# Patient Record
Sex: FEMALE | Race: WHITE | Employment: OTHER | URBAN - METROPOLITAN AREA
[De-identification: names, ages, dates, MRNs, and addresses within clinical notes are randomized per-mention and may not be internally consistent; named-entity substitution may affect disease eponyms.]

---

## 2019-09-06 ENCOUNTER — HOSPITAL ENCOUNTER (OUTPATIENT)
Dept: ULTRASOUND IMAGING | Age: 79
Discharge: NURSING, REHAB OR LONG-TERM CARE | End: 2019-09-06
Attending: FAMILY MEDICINE
Payer: MEDICARE

## 2019-09-06 DIAGNOSIS — R19.5 LOOSE STOOLS: ICD-10-CM

## 2019-09-06 PROCEDURE — 76700 US EXAM ABDOM COMPLETE: CPT

## 2020-02-21 ENCOUNTER — APPOINTMENT (OUTPATIENT)
Dept: GENERAL RADIOLOGY | Age: 80
DRG: 185 | End: 2020-02-21
Attending: EMERGENCY MEDICINE
Payer: MEDICARE

## 2020-02-21 ENCOUNTER — HOSPITAL ENCOUNTER (INPATIENT)
Age: 80
LOS: 3 days | Discharge: NURSING, REHAB OR LONG-TERM CARE | DRG: 185 | End: 2020-02-27
Attending: EMERGENCY MEDICINE | Admitting: INTERNAL MEDICINE
Payer: MEDICARE

## 2020-02-21 ENCOUNTER — APPOINTMENT (OUTPATIENT)
Dept: CT IMAGING | Age: 80
DRG: 185 | End: 2020-02-21
Attending: EMERGENCY MEDICINE
Payer: MEDICARE

## 2020-02-21 ENCOUNTER — APPOINTMENT (OUTPATIENT)
Dept: GENERAL RADIOLOGY | Age: 80
DRG: 185 | End: 2020-02-21
Attending: HOSPITALIST
Payer: MEDICARE
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
## 2020-02-21 DIAGNOSIS — S22.32XK: ICD-10-CM

## 2020-02-21 DIAGNOSIS — S22.39XA CLOSED FRACTURE OF ONE RIB, UNSPECIFIED LATERALITY, INITIAL ENCOUNTER: ICD-10-CM

## 2020-02-21 DIAGNOSIS — R07.9 CHEST PAIN, UNSPECIFIED TYPE: Primary | ICD-10-CM

## 2020-02-21 LAB
ALBUMIN SERPL-MCNC: 3.9 G/DL (ref 3.5–5)
ALBUMIN/GLOB SERPL: 1.1 {RATIO} (ref 1.1–2.2)
ALP SERPL-CCNC: 102 U/L (ref 45–117)
ALT SERPL-CCNC: 29 U/L (ref 12–78)
ANION GAP SERPL CALC-SCNC: 10 MMOL/L (ref 5–15)
AST SERPL-CCNC: 22 U/L (ref 15–37)
ATRIAL RATE: 78 BPM
BASOPHILS # BLD: 0 K/UL (ref 0–0.1)
BASOPHILS NFR BLD: 0 % (ref 0–1)
BILIRUB SERPL-MCNC: 0.5 MG/DL (ref 0.2–1)
BUN SERPL-MCNC: 32 MG/DL (ref 6–20)
BUN/CREAT SERPL: 27 (ref 12–20)
CALCIUM SERPL-MCNC: 9.1 MG/DL (ref 8.5–10.1)
CALCULATED P AXIS, ECG09: 61 DEGREES
CALCULATED R AXIS, ECG10: 50 DEGREES
CALCULATED T AXIS, ECG11: 29 DEGREES
CHLORIDE SERPL-SCNC: 101 MMOL/L (ref 97–108)
CO2 SERPL-SCNC: 31 MMOL/L (ref 21–32)
COMMENT, HOLDF: NORMAL
CREAT SERPL-MCNC: 1.19 MG/DL (ref 0.55–1.02)
DIAGNOSIS, 93000: NORMAL
DIFFERENTIAL METHOD BLD: ABNORMAL
EOSINOPHIL # BLD: 0.2 K/UL (ref 0–0.4)
EOSINOPHIL NFR BLD: 2 % (ref 0–7)
ERYTHROCYTE [DISTWIDTH] IN BLOOD BY AUTOMATED COUNT: 13.1 % (ref 11.5–14.5)
GLOBULIN SER CALC-MCNC: 3.4 G/DL (ref 2–4)
GLUCOSE BLD STRIP.AUTO-MCNC: 146 MG/DL (ref 65–100)
GLUCOSE BLD STRIP.AUTO-MCNC: 154 MG/DL (ref 65–100)
GLUCOSE BLD STRIP.AUTO-MCNC: 193 MG/DL (ref 65–100)
GLUCOSE BLD STRIP.AUTO-MCNC: 225 MG/DL (ref 65–100)
GLUCOSE SERPL-MCNC: 178 MG/DL (ref 65–100)
HCT VFR BLD AUTO: 42.7 % (ref 35–47)
HGB BLD-MCNC: 13.9 G/DL (ref 11.5–16)
IMM GRANULOCYTES # BLD AUTO: 0 K/UL (ref 0–0.04)
IMM GRANULOCYTES NFR BLD AUTO: 0 % (ref 0–0.5)
LYMPHOCYTES # BLD: 2.2 K/UL (ref 0.8–3.5)
LYMPHOCYTES NFR BLD: 20 % (ref 12–49)
MCH RBC QN AUTO: 29.8 PG (ref 26–34)
MCHC RBC AUTO-ENTMCNC: 32.6 G/DL (ref 30–36.5)
MCV RBC AUTO: 91.6 FL (ref 80–99)
MONOCYTES # BLD: 0.8 K/UL (ref 0–1)
MONOCYTES NFR BLD: 7 % (ref 5–13)
NEUTS SEG # BLD: 7.8 K/UL (ref 1.8–8)
NEUTS SEG NFR BLD: 71 % (ref 32–75)
NRBC # BLD: 0 K/UL (ref 0–0.01)
NRBC BLD-RTO: 0 PER 100 WBC
P-R INTERVAL, ECG05: 162 MS
PLATELET # BLD AUTO: 148 K/UL (ref 150–400)
PMV BLD AUTO: 12.1 FL (ref 8.9–12.9)
POTASSIUM SERPL-SCNC: 4.1 MMOL/L (ref 3.5–5.1)
PROT SERPL-MCNC: 7.3 G/DL (ref 6.4–8.2)
Q-T INTERVAL, ECG07: 382 MS
QRS DURATION, ECG06: 64 MS
QTC CALCULATION (BEZET), ECG08: 435 MS
RBC # BLD AUTO: 4.66 M/UL (ref 3.8–5.2)
SAMPLES BEING HELD,HOLD: NORMAL
SERVICE CMNT-IMP: ABNORMAL
SODIUM SERPL-SCNC: 142 MMOL/L (ref 136–145)
TROPONIN I SERPL-MCNC: <0.05 NG/ML
TROPONIN I SERPL-MCNC: <0.05 NG/ML
VENTRICULAR RATE, ECG03: 78 BPM
WBC # BLD AUTO: 11.1 K/UL (ref 3.6–11)

## 2020-02-21 PROCEDURE — 99218 HC RM OBSERVATION: CPT

## 2020-02-21 PROCEDURE — 82962 GLUCOSE BLOOD TEST: CPT

## 2020-02-21 PROCEDURE — 96375 TX/PRO/DX INJ NEW DRUG ADDON: CPT

## 2020-02-21 PROCEDURE — 80053 COMPREHEN METABOLIC PANEL: CPT

## 2020-02-21 PROCEDURE — 77030027138 HC INCENT SPIROMETER -A

## 2020-02-21 PROCEDURE — 36415 COLL VENOUS BLD VENIPUNCTURE: CPT

## 2020-02-21 PROCEDURE — 74011000250 HC RX REV CODE- 250: Performed by: HOSPITALIST

## 2020-02-21 PROCEDURE — 71045 X-RAY EXAM CHEST 1 VIEW: CPT

## 2020-02-21 PROCEDURE — 96374 THER/PROPH/DIAG INJ IV PUSH: CPT

## 2020-02-21 PROCEDURE — 96372 THER/PROPH/DIAG INJ SC/IM: CPT

## 2020-02-21 PROCEDURE — 74011250636 HC RX REV CODE- 250/636: Performed by: HOSPITALIST

## 2020-02-21 PROCEDURE — 85025 COMPLETE CBC W/AUTO DIFF WBC: CPT

## 2020-02-21 PROCEDURE — 97116 GAIT TRAINING THERAPY: CPT

## 2020-02-21 PROCEDURE — 72170 X-RAY EXAM OF PELVIS: CPT

## 2020-02-21 PROCEDURE — 74011250636 HC RX REV CODE- 250/636: Performed by: EMERGENCY MEDICINE

## 2020-02-21 PROCEDURE — 96376 TX/PRO/DX INJ SAME DRUG ADON: CPT

## 2020-02-21 PROCEDURE — 74011250637 HC RX REV CODE- 250/637: Performed by: HOSPITALIST

## 2020-02-21 PROCEDURE — 99282 EMERGENCY DEPT VISIT SF MDM: CPT

## 2020-02-21 PROCEDURE — 93005 ELECTROCARDIOGRAM TRACING: CPT

## 2020-02-21 PROCEDURE — 97530 THERAPEUTIC ACTIVITIES: CPT

## 2020-02-21 PROCEDURE — 71250 CT THORAX DX C-: CPT

## 2020-02-21 PROCEDURE — 84484 ASSAY OF TROPONIN QUANT: CPT

## 2020-02-21 PROCEDURE — 74011636637 HC RX REV CODE- 636/637: Performed by: HOSPITALIST

## 2020-02-21 PROCEDURE — 97161 PT EVAL LOW COMPLEX 20 MIN: CPT

## 2020-02-21 RX ORDER — INSULIN LISPRO 100 [IU]/ML
INJECTION, SOLUTION INTRAVENOUS; SUBCUTANEOUS
Status: DISCONTINUED | OUTPATIENT
Start: 2020-02-21 | End: 2020-02-27 | Stop reason: HOSPADM

## 2020-02-21 RX ORDER — HYDROMORPHONE HYDROCHLORIDE 1 MG/ML
0.5 INJECTION, SOLUTION INTRAMUSCULAR; INTRAVENOUS; SUBCUTANEOUS
Status: COMPLETED | OUTPATIENT
Start: 2020-02-21 | End: 2020-02-21

## 2020-02-21 RX ORDER — LANOLIN ALCOHOL/MO/W.PET/CERES
1-2 CREAM (GRAM) TOPICAL DAILY
COMMUNITY

## 2020-02-21 RX ORDER — GUAIFENESIN 100 MG/5ML
81 LIQUID (ML) ORAL DAILY
Status: DISCONTINUED | OUTPATIENT
Start: 2020-02-21 | End: 2020-02-27 | Stop reason: HOSPADM

## 2020-02-21 RX ORDER — IPRATROPIUM BROMIDE AND ALBUTEROL SULFATE 2.5; .5 MG/3ML; MG/3ML
3 SOLUTION RESPIRATORY (INHALATION)
Status: DISCONTINUED | OUTPATIENT
Start: 2020-02-21 | End: 2020-02-27 | Stop reason: HOSPADM

## 2020-02-21 RX ORDER — DILTIAZEM HYDROCHLORIDE 180 MG/1
180 CAPSULE, COATED, EXTENDED RELEASE ORAL DAILY
Status: DISCONTINUED | OUTPATIENT
Start: 2020-02-21 | End: 2020-02-21

## 2020-02-21 RX ORDER — ONDANSETRON 2 MG/ML
4 INJECTION INTRAMUSCULAR; INTRAVENOUS
Status: COMPLETED | OUTPATIENT
Start: 2020-02-21 | End: 2020-02-21

## 2020-02-21 RX ORDER — ENOXAPARIN SODIUM 100 MG/ML
1 INJECTION SUBCUTANEOUS
Status: COMPLETED | OUTPATIENT
Start: 2020-02-21 | End: 2020-02-21

## 2020-02-21 RX ORDER — HYDROCODONE BITARTRATE AND ACETAMINOPHEN 5; 325 MG/1; MG/1
1 TABLET ORAL
Status: DISCONTINUED | OUTPATIENT
Start: 2020-02-21 | End: 2020-02-25

## 2020-02-21 RX ORDER — SODIUM CHLORIDE 0.9 % (FLUSH) 0.9 %
10 SYRINGE (ML) INJECTION
Status: ACTIVE | OUTPATIENT
Start: 2020-02-21 | End: 2020-02-21

## 2020-02-21 RX ORDER — ACETAMINOPHEN 325 MG/1
650 TABLET ORAL
Status: DISCONTINUED | OUTPATIENT
Start: 2020-02-21 | End: 2020-02-27 | Stop reason: HOSPADM

## 2020-02-21 RX ORDER — MORPHINE SULFATE 2 MG/ML
1 INJECTION, SOLUTION INTRAMUSCULAR; INTRAVENOUS
Status: DISCONTINUED | OUTPATIENT
Start: 2020-02-21 | End: 2020-02-27 | Stop reason: HOSPADM

## 2020-02-21 RX ORDER — NITROGLYCERIN 0.4 MG/1
0.4 TABLET SUBLINGUAL
Status: DISCONTINUED | OUTPATIENT
Start: 2020-02-21 | End: 2020-02-27 | Stop reason: HOSPADM

## 2020-02-21 RX ORDER — SODIUM CHLORIDE 9 MG/ML
75 INJECTION, SOLUTION INTRAVENOUS CONTINUOUS
Status: DISPENSED | OUTPATIENT
Start: 2020-02-21 | End: 2020-02-22

## 2020-02-21 RX ORDER — LATANOPROST 50 UG/ML
1 SOLUTION/ DROPS OPHTHALMIC
Status: DISCONTINUED | OUTPATIENT
Start: 2020-02-21 | End: 2020-02-27 | Stop reason: HOSPADM

## 2020-02-21 RX ORDER — SODIUM CHLORIDE 9 MG/ML
50 INJECTION, SOLUTION INTRAVENOUS ONCE
Status: DISCONTINUED | OUTPATIENT
Start: 2020-02-21 | End: 2020-02-21

## 2020-02-21 RX ORDER — MAGNESIUM SULFATE 100 %
4 CRYSTALS MISCELLANEOUS AS NEEDED
Status: DISCONTINUED | OUTPATIENT
Start: 2020-02-21 | End: 2020-02-27 | Stop reason: HOSPADM

## 2020-02-21 RX ORDER — SODIUM CHLORIDE 0.9 % (FLUSH) 0.9 %
5-40 SYRINGE (ML) INJECTION AS NEEDED
Status: DISCONTINUED | OUTPATIENT
Start: 2020-02-21 | End: 2020-02-27 | Stop reason: HOSPADM

## 2020-02-21 RX ORDER — ATORVASTATIN CALCIUM 20 MG/1
20 TABLET, FILM COATED ORAL
Status: DISCONTINUED | OUTPATIENT
Start: 2020-02-21 | End: 2020-02-27 | Stop reason: HOSPADM

## 2020-02-21 RX ORDER — DEXTROSE MONOHYDRATE 100 MG/ML
0-250 INJECTION, SOLUTION INTRAVENOUS AS NEEDED
Status: DISCONTINUED | OUTPATIENT
Start: 2020-02-21 | End: 2020-02-27 | Stop reason: HOSPADM

## 2020-02-21 RX ORDER — LIDOCAINE 4 G/100G
1 PATCH TOPICAL EVERY 24 HOURS
Status: DISCONTINUED | OUTPATIENT
Start: 2020-02-21 | End: 2020-02-25

## 2020-02-21 RX ORDER — LOSARTAN POTASSIUM 100 MG/1
100 TABLET ORAL DAILY
COMMUNITY

## 2020-02-21 RX ORDER — ENOXAPARIN SODIUM 100 MG/ML
1 INJECTION SUBCUTANEOUS EVERY 24 HOURS
Status: DISCONTINUED | OUTPATIENT
Start: 2020-02-21 | End: 2020-02-21

## 2020-02-21 RX ORDER — GLIPIZIDE 5 MG/1
5 TABLET ORAL DAILY
COMMUNITY

## 2020-02-21 RX ORDER — SODIUM CHLORIDE 0.9 % (FLUSH) 0.9 %
5-40 SYRINGE (ML) INJECTION EVERY 8 HOURS
Status: DISCONTINUED | OUTPATIENT
Start: 2020-02-21 | End: 2020-02-27 | Stop reason: HOSPADM

## 2020-02-21 RX ORDER — MELATONIN
1000 DAILY
COMMUNITY

## 2020-02-21 RX ADMIN — INSULIN LISPRO 3 UNITS: 100 INJECTION, SOLUTION INTRAVENOUS; SUBCUTANEOUS at 09:10

## 2020-02-21 RX ADMIN — ASPIRIN 81 MG 81 MG: 81 TABLET ORAL at 09:10

## 2020-02-21 RX ADMIN — ATORVASTATIN CALCIUM 20 MG: 20 TABLET, FILM COATED ORAL at 22:36

## 2020-02-21 RX ADMIN — HYDROMORPHONE HYDROCHLORIDE 0.5 MG: 1 INJECTION, SOLUTION INTRAMUSCULAR; INTRAVENOUS; SUBCUTANEOUS at 04:25

## 2020-02-21 RX ADMIN — Medication 10 ML: at 22:32

## 2020-02-21 RX ADMIN — INSULIN LISPRO 2 UNITS: 100 INJECTION, SOLUTION INTRAVENOUS; SUBCUTANEOUS at 16:30

## 2020-02-21 RX ADMIN — HYDROMORPHONE HYDROCHLORIDE 0.5 MG: 1 INJECTION, SOLUTION INTRAMUSCULAR; INTRAVENOUS; SUBCUTANEOUS at 05:59

## 2020-02-21 RX ADMIN — SODIUM CHLORIDE 75 ML/HR: 900 INJECTION, SOLUTION INTRAVENOUS at 09:09

## 2020-02-21 RX ADMIN — SODIUM CHLORIDE 75 ML/HR: 900 INJECTION, SOLUTION INTRAVENOUS at 22:48

## 2020-02-21 RX ADMIN — ENOXAPARIN SODIUM 90 MG: 100 INJECTION SUBCUTANEOUS at 05:06

## 2020-02-21 RX ADMIN — HYDROMORPHONE HYDROCHLORIDE 0.5 MG: 1 INJECTION, SOLUTION INTRAMUSCULAR; INTRAVENOUS; SUBCUTANEOUS at 02:58

## 2020-02-21 RX ADMIN — Medication 10 ML: at 14:00

## 2020-02-21 RX ADMIN — MORPHINE SULFATE 1 MG: 2 INJECTION, SOLUTION INTRAMUSCULAR; INTRAVENOUS at 18:40

## 2020-02-21 RX ADMIN — INSULIN LISPRO 2 UNITS: 100 INJECTION, SOLUTION INTRAVENOUS; SUBCUTANEOUS at 12:21

## 2020-02-21 RX ADMIN — SODIUM CHLORIDE 1000 ML: 900 INJECTION, SOLUTION INTRAVENOUS at 03:42

## 2020-02-21 RX ADMIN — LATANOPROST 1 DROP: 50 SOLUTION OPHTHALMIC at 22:36

## 2020-02-21 RX ADMIN — HYDROCODONE BITARTRATE AND ACETAMINOPHEN 1 TABLET: 5; 325 TABLET ORAL at 20:37

## 2020-02-21 RX ADMIN — ONDANSETRON 4 MG: 2 INJECTION, SOLUTION INTRAMUSCULAR; INTRAVENOUS at 02:59

## 2020-02-21 NOTE — ED NOTES
TRANSFER - OUT REPORT:    Verbal report given to Reg Nesbitt RN on Ethan Guyr  being transferred to [facility] Emergency Department (unit) for routine progression of care       Report consisted of patients Situation, Background, Assessment and   Recommendations(SBAR). Information from the following report(s) ED Summary was reviewed with the receiving nurse. Lines:   Peripheral IV 02/21/20 Right Antecubital (Active)   Site Assessment Clean, dry, & intact 2/21/2020  2:54 AM   Phlebitis Assessment 0 2/21/2020  2:54 AM   Infiltration Assessment 0 2/21/2020  2:54 AM   Dressing Status Clean, dry, & intact 2/21/2020  2:54 AM   Dressing Type Transparent 2/21/2020  2:54 AM   Hub Color/Line Status Pink;Patent; Flushed 2/21/2020  2:54 AM   Action Taken Blood drawn 2/21/2020  2:54 AM   Alcohol Cap Used Yes 2/21/2020  2:54 AM        Opportunity for questions and clarification was provided.       Patient transported with:   Monitor by [employer]

## 2020-02-21 NOTE — PROGRESS NOTES
Orders received, chart reviewed and patient evaluated by physical therapy. Pending progression with skilled acute physical therapy, recommend:  Therapy up to 5 days/week in an inpatient setting or home health therapy program and caregiver assistance for bed transfers and upright mobility. BP assessed sup/sit/stand, negative for orthostatic hypotension though patient did voice feeling light headed when up. O2 removed for therapy activity. SpO2 remained in mid to upper 90's while on room air. Recommend with nursing patient to complete as able in order to maintain strength, endurance and independence: OOB to chair 3x/day for meals with assist x1 and ambulating with rolling walker and assist x1. Thank you for your assistance. Full evaluation to follow.

## 2020-02-21 NOTE — ED TRIAGE NOTES
Pt claims she fell down 3 steps injuring L. Side ribs and flank pain. Pain increases with deep breath and movement. No obvious bruising. Lung sounds = bilaterally.

## 2020-02-21 NOTE — H&P
HISTORY AND PHYSICAL  Odessa Whaley MD        PCP: Leandro Olivares MD    Please note that this dictation was completed with [employer], the computer voice recognition software. Quite often unanticipated grammatical, syntax, homophones, and other interpretive errors are inadvertently transcribed by the computer software. Please disregard these errors. Please excuse any errors that have escaped final proofreading. CHIEF COMPLAINTS    Left-sided chest pain following a fall    HISTORY OF PRESENT ILLNESS   This is a 43-year-old female with a past medical history of coronary artery disease and had stents put in first in 1995 and recently 2014, hypertension, COPD, type 2 diabetes, GERD, renal cell carcinoma status post nephrectomy December 2019 presented to the [facility] emergency room for a left-sided chest pain. Patient fell 3 steps when she missed a step. Experiencing pain since then worse with deep breathing. Her work-up thus far showed negative cardiac enzymes, CAT scan of the chest showed a nondisplaced left sixth anterior rib fracture. In the ED she received 3 doses of IV Dilaudid. In the ED, there was some thought about PE and she received a therapeutic dose of Lovenox empirically. No imaging study was performed.   However, patient came with the left-sided chest pain after a mechanical fall, not a syncopal event, with reproducible tenderness and CAT scan is showing rib fracture, besides patient is not tachycardic nor hypoxic, as such no clinical grounds to entertain PE.      PMH/PSH:  Past Medical History:   Diagnosis Date    CAD (coronary artery disease)     COPD     Diabetes ([address]     Gastrointestinal disorder     GERD (gastroesophageal reflux disease)     Hypertension     MI (myocardial infarction) ([address]     Dr. Donte Pate Osteoporosis      Past Surgical History:   Procedure Laterality Date    CARDIAC SURG PROCEDURE UNLIST      stents x 3    HX HEENT      tonsillectomy    HX NEPHRECTOMY Right     partial dec2019 for ca    HX ORTHOPAEDIC      bilateral hips replaced    HX ORTHOPAEDIC      L wrist titanium gentry placed    HX ORTHOPAEDIC      carpal tunnel bilaterally    HX ORTHOPAEDIC      trigger finger release on bilateral thumbs and ring and middle finger on L hand       Home meds:   Prior to Admission medications    Medication Sig Start Date End Date Taking? Authorizing Provider   glipiZIDE (GLUCOTROL) 5 mg tablet Take 5 mg by mouth daily. Yes Provider, Historical   glucosamine-chondroitin (ARTHX) 500-400 mg cap Take 1-2 Caps by mouth daily. Yes Provider, Historical   losartan (COZAAR) 100 mg tablet Take 100 mg by mouth daily. Yes Provider, Historical   SITagliptin (JANUVIA) 50 mg tablet Take 50 mg by mouth daily. Yes Provider, Historical   cholecalciferol (VITAMIN D3) (1000 Units /25 mcg) tablet Take 1,000 Units by mouth daily. Yes Provider, Historical   vit C,Q-Ya-maydi-lutein-zeaxan (PRESERVISION AREDS-2) 151-508-91-1 mg-unit-mg-mg cap capsule Take 1 Cap by mouth two (2) times daily (with meals). Yes Provider, Historical   aspirin delayed-release 81 mg tablet Take 1 Tab by mouth daily. 4/18/14  Yes Dharmesh Porter MD   tiotropium (SPIRIVA WITH HANDIHALER) 18 mcg inhalation capsule Take 1 Cap by inhalation daily. Yes Other, MD [place]   therapeutic multivitamin (THERAGRAN) tablet Take 1 Tab by mouth daily. Yes Provider, Historical   valsartan-hydroCHLOROthiazide (DIOVAN HCT) 320-25 mg per tablet Take 1 Tab by mouth daily. Yes Other, MD [place]   atorvastatin (LIPITOR) 10 mg tablet Take 10 mg by mouth nightly. Yes Danya, MD [place]   latanoprost (XALATAN) 0.005 % ophthalmic solution Administer 1 Drop to both eyes nightly. Yes Other, MD [place]   nitroglycerin (NITROSTAT) 0.4 mg SL tablet 1 Tab by SubLINGual route every five (5) minutes as needed for Chest Pain. 4/18/14   Dharmesh Porter MD       Allergies:   Allergies   Allergen Reactions    Atenolol Hives    Codeine Nausea and Vomiting    Macrobid [Nitrofurantoin Monohyd/M-Cryst] Rash       FH:  History reviewed. No pertinent family history. SH:  Social History     Tobacco Use    Smoking status: Former Smoker     Last attempt to quit: 1994     Years since quittin.8    Smokeless tobacco: Never Used   Substance Use Topics    Alcohol use: Yes     Comment: rarely       ROS: A comprehensive review of systems was negative except for that written in the HPI. PHYSICAL EXAM:  Visit Vitals  /61   Pulse 67   Temp 98.2 °F (36.8 °C)   Resp 28   Ht 5' 5\" (1.651 m)   Wt 88.5 kg (195 lb)   SpO2 96%   BMI 32.45 kg/m²       General:           Alert oriented x4. Some discomfort on deep breathing. Sumaya Goins HEENT:           Atraumatic, anicteric sclerae, pink conjunctivae                          No oral ulcers, mucosa moist, throat clear, dentition fair  Neck:               Supple, symmetrical,  thyroid: non tender  Lungs:              Not in distress. On room air. Tenderness on the left anterolateral mid chest.  Symmetrical air entry. No no wheezing  Chest wall:      No tenderness  No Accessory muscle use. Heart:              Regular  rhythm,  No  murmur   No edema  Abdomen:        Soft, non-tender. Not distended. Bowel sounds normal  Extremities:      Superficial laceration on the right knee. Moves all extremities fine, left hip range of motion limited which he says is chronic from her DJD  Skin:                Not pale. Not Jaundiced  No rashes   Psych:             Not anxious or agitated. Neurologic:     Alert and oriented to PPT, CNII-XII intact. Motor and sensory exam grossly intact.   Labs/Imaging:  Recent Results (from the past 24 hour(s))   EKG, 12 LEAD, INITIAL    Collection Time: 20  2:42 AM   Result Value Ref Range    Ventricular Rate 78 BPM    Atrial Rate 78 BPM    P-R Interval 162 ms    QRS Duration 64 ms    Q-T Interval 382 ms    QTC Calculation (Bezet) 435 ms    Calculated P Axis 61 degrees    Calculated R Axis 50 degrees    Calculated T Axis 29 degrees    Diagnosis       Normal sinus rhythm  Cannot rule out Anterior infarct , age undetermined  Abnormal ECG  When compared with ECG of 18-APR-2014 08:09,  ST elevation now present in Inferior leads  ST now depressed in Anterior leads  T wave amplitude has decreased in Anterior leads     CBC WITH AUTOMATED DIFF    Collection Time: 02/21/20  2:55 AM   Result Value Ref Range    WBC 11.1 (H) 3.6 - 11.0 K/uL    RBC 4.66 3.80 - 5.20 M/uL    HGB 13.9 11.5 - 16.0 g/dL    HCT 42.7 35.0 - 47.0 %    MCV 91.6 80.0 - 99.0 FL    MCH 29.8 26.0 - 34.0 PG    MCHC 32.6 30.0 - 36.5 g/dL    RDW 13.1 11.5 - 14.5 %    PLATELET 936 (L) 897 - 400 K/uL    MPV 12.1 8.9 - 12.9 FL    NRBC 0.0 0  WBC    ABSOLUTE NRBC 0.00 0.00 - 0.01 K/uL    NEUTROPHILS 71 32 - 75 %    LYMPHOCYTES 20 12 - 49 %    MONOCYTES 7 5 - 13 %    EOSINOPHILS 2 0 - 7 %    BASOPHILS 0 0 - 1 %    IMMATURE GRANULOCYTES 0 0.0 - 0.5 %    ABS. NEUTROPHILS 7.8 1.8 - 8.0 K/UL    ABS. LYMPHOCYTES 2.2 0.8 - 3.5 K/UL    ABS. MONOCYTES 0.8 0.0 - 1.0 K/UL    ABS. EOSINOPHILS 0.2 0.0 - 0.4 K/UL    ABS. BASOPHILS 0.0 0.0 - 0.1 K/UL    ABS. IMM. GRANS. 0.0 0.00 - 0.04 K/UL    DF AUTOMATED     METABOLIC PANEL, COMPREHENSIVE    Collection Time: 02/21/20  2:55 AM   Result Value Ref Range    Sodium 142 136 - 145 mmol/L    Potassium 4.1 3.5 - 5.1 mmol/L    Chloride 101 97 - 108 mmol/L    CO2 31 21 - 32 mmol/L    Anion gap 10 5 - 15 mmol/L    Glucose 178 (H) 65 - 100 mg/dL    BUN 32 (H) 6 - 20 MG/DL    Creatinine 1.19 (H) 0.55 - 1.02 MG/DL    BUN/Creatinine ratio 27 (H) 12 - 20      GFR est AA 53 (L) >60 ml/min/1.73m2    GFR est non-AA 44 (L) >60 ml/min/1.73m2    Calcium 9.1 8.5 - 10.1 MG/DL    Bilirubin, total 0.5 0.2 - 1.0 MG/DL    ALT (SGPT) 29 12 - 78 U/L    AST (SGOT) 22 15 - 37 U/L    Alk.  phosphatase 102 45 - 117 U/L    Protein, total 7.3 6.4 - 8.2 g/dL    Albumin 3.9 3.5 - 5.0 g/dL    Globulin 3.4 2.0 - 4.0 g/dL    A-G Ratio 1.1 1.1 - 2.2     TROPONIN I    Collection Time: 02/21/20  2:55 AM   Result Value Ref Range    Troponin-I, Qt. <0.05 <0.05 ng/mL   SAMPLES BEING HELD    Collection Time: 02/21/20  2:55 AM   Result Value Ref Range    SAMPLES BEING HELD BLUE,GREEN,LAV     COMMENT        Add-on orders for these samples will be processed based on acceptable specimen integrity and analyte stability, which may vary by analyte. GLUCOSE, POC    Collection Time: 02/21/20  8:43 AM   Result Value Ref Range    Glucose (POC) 225 (H) 65 - 100 mg/dL    Performed by [facility] [address], POC    Collection Time: 02/21/20 12:05 PM   Result Value Ref Range    Glucose (POC) 193 (H) 65 - 100 mg/dL    Performed by [facility] GEREMIASRUBINA RON        Recent Labs     02/21/20  0255   WBC 11.1*   HGB 13.9   HCT 42.7   *     Recent Labs     02/21/20  0255      K 4.1      CO2 31   BUN 32*   CREA 1.19*   *   CA 9.1     Recent Labs     02/21/20  0255   SGOT 22   ALT 29      TBILI 0.5   TP 7.3   ALB 3.9   GLOB 3.4       Recent Labs     02/21/20  0255   TROIQ <0.05       No results for input(s): INR, PTP, APTT, INREXT in the last 72 hours. No results for input(s): PH, PCO2, PO2 in the last 72 hours. XR PELV AP ONLY  Narrative: EXAM:  XR PELV AP ONLY    INDICATION:   She fell last night and broke left 6th rib,she now has worse than  baseline left hip pain    COMPARISON: None. FINDINGS: An AP view of the pelvis demonstrates bilateral total hip  replacements. The entire femoral gentry are not included on the study. No pelvic  fracture is identified allowing for technique. Impression: IMPRESSION: Patient is status post bilateral total hip arthroplasties. No pelvic  fracture identified. The entire femoral rods are not included on this study. CT CHEST WO CONT  Narrative: INDICATION: Chest pain    COMPARISON: March 23, 2007    CONTRAST: None.     TECHNIQUE:  5 mm axial images were obtained through the chest. Coronal and  sagittal reconstructions were generated. CT dose reduction was achieved through  use of a standardized protocol tailored for this examination and automatic  exposure control for dose modulation. The absence of intravenous contrast reduces the sensitivity for evaluation of  the mediastinum and upper abdominal organs. FINDINGS:  CHEST WALL: 1.9 x 1.2 cm left axillary lymph node. THYROID: No nodule. MEDIASTINUM: No mass or lymphadenopathy. ANDERSON: No mass or lymphadenopathy. THORACIC AORTA: No aneurysm. MAIN PULMONARY ARTERY: Normal in caliber. TRACHEA/BRONCHI: Patent. ESOPHAGUS: No wall thickening or dilatation. HEART: Normal in size. Extensive coronary artery calcifications. PLEURA: No effusion or pneumothorax. LUNGS: Unchanged 9 mm left lower lobe pulmonary nodule. Unchanged 4 mm right  middle lobe pulmonary nodule. Bilateral dependent atelectasis. INCIDENTALLY IMAGED UPPER ABDOMEN: 11 mm cyst in the left hepatic lobe. BONES: Nondisplaced left sixth anterior rib fracture. Impression: IMPRESSION:  Nondisplaced left sixth anterior rib fracture. Left axillary lymphadenopathy. Unchanged pulmonary nodules. XR CHEST PORT  Narrative: INDICATION: Chest pain    COMPARISON: April 17, 2014    FINDINGS: AP portable imaging of the chest performed at 2:41 AM demonstrates a  stable cardiomediastinal silhouette. The lungs are clear bilaterally. No  significant osseous abnormalities are seen. Impression: IMPRESSION: No evidence of acute cardiopulmonary process. Assessment & Plan: This is a 70-year-old female who came from home with left-sided chest pain after a fall. She missed a step and she fell down 3 steps. She landed on the left side, denied hitting her head. No presyncope or syncope. She is tender on the left anterior chest, CT scan showed 6 rib fracture nondisplaced. #Left anterior sixth rib fracture, nondisplaced.   Pain exacerbated by deep breathing with reproducible tenderness  -MI/PE unlikely in the current clinical context  -Admit patient for pain control  -Incentive spirometry  -Pelvic x-rays negative for acute fracture dislocation  -PT and OT evaluation  #Mild dehydration: Continue IV fluid x24 hours. Hold losartan and diuretics. #Diabetes: Check blood sugar pre-meal and at night, cover with Humalog sliding scale.  -Can resume oral hypoglycemics upon discharge  # RCC status post recent partial resection of the right kidney  #CAD status post stent in 1995 and 2014: Continue antiplatelets  #DJD with bilateral hip replacement, the left hip is bothering her.   Pelvic x-rays negative  #COPD without bronchospasm: Continue bronchodilators  #Hypertension: Fairly controlled         Patient's Baseline: Ambulates with walking  DVT ppx: Lovenox  Code status: Full code  Disposition: Anticipate home with home health physical therapy services                Signed By: Danya Pugh MD     February 21, 2020

## 2020-02-21 NOTE — ED NOTES
[facility] en route to [facility] with patient. [facility] ED updated with ETA.

## 2020-02-21 NOTE — ROUTINE PROCESS
TRANSFER - OUT REPORT: 
 
Verbal report given to AMRITA Chahal(name) on [address] [place] [address]  being transferred to (unit) for routine progression of care Report consisted of patients Situation, Background, Assessment and  
Recommendations(SBAR). Information from the following report(s) SBAR, ED Summary, [facility] and Recent Results was reviewed with the receiving nurse. Lines:  
Peripheral IV 02/21/20 Right Antecubital (Active) Site Assessment Clean, dry, & intact 2/21/2020  2:54 AM  
Phlebitis Assessment 0 2/21/2020  2:54 AM  
Infiltration Assessment 0 2/21/2020  2:54 AM  
Dressing Status Clean, dry, & intact 2/21/2020  2:54 AM  
Dressing Type Transparent 2/21/2020  2:54 AM  
Hub Color/Line Status Pink;Patent; Flushed 2/21/2020  2:54 AM  
Action Taken Blood drawn 2/21/2020  2:54 AM  
Alcohol Cap Used Yes 2/21/2020  2:54 AM  
  
 
Opportunity for questions and clarification was provided. Patient transported with: 
Transportation

## 2020-02-21 NOTE — PROGRESS NOTES
Admission Medication Reconciliation:    Information obtained from:  Patient, pharmacy  RxQuery data available¹:  NO    Comments/Recommendations: Updated PTA meds/reviewed patient's allergies. 1)  Reviewed medication list provided by patient's . Patient reports that the medication list is up to date except for Januvia. [address] for [employer] dosing information, which is 50mg daily. Patient reports the she took her medications yesterday morning. 2)  Medication changes (since last review): Added  - Sitagliptin 50mg 1tab po daily  - Losartan 100mg 1tab po daily  - Preservision AREDS2 1cap po daily  - Glucosamine 1-2caps po daily  - Glipizide 5mg 1tab po daily    Adjusted  - Atorvastatin 20mg 1tab po nightly --> 10mg 1tab po nightly    Removed  - Calcium carbonate 600mg po daily  - Diltiazem CD 180mg po daily  - Metformin 1000mg po daily  - Metformin 500mg po daily  - Ticagrelor 90mg 1tab po every 12 hours  - Valsartan-hydrochlorothiazide 160-25mg 1tab po daily     ¹RxQuery pharmacy benefit data reflects medications filled and processed through the patient's insurance, however   this data does NOT capture whether the medication was picked up or is currently being taken by the patient. Allergies:  Atenolol; Codeine; and Macrobid [nitrofurantoin monohyd/m-cryst]    Significant PMH/Disease States:   Past Medical History:   Diagnosis Date    CAD (coronary artery disease)     COPD     [address] [address]     Gastrointestinal disorder     GERD (gastroesophageal reflux disease)     Hypertension     MI (myocardial infarction) ([facility] [address]     Dr. Virginia Díaz for this Admission:    Chief Complaint   Patient presents with    Rib Injury    Chest Pain     Prior to Admission Medications:   Prior to Admission Medications   Prescriptions Last Dose Informant Taking? SITagliptin (JANUVIA) 50 mg tablet 2/20/2020 at Unknown time  Yes   Sig: Take 50 mg by mouth daily.    aspirin delayed-release 81 mg tablet 2020 at Unknown time  Yes   Sig: Take 1 Tab by mouth daily. atorvastatin (LIPITOR) 10 mg tablet   Yes   Sig: Take 10 mg by mouth nightly. cholecalciferol (VITAMIN D3) (1000 Units /25 mcg) tablet 2020 at Unknown time  Yes   Sig: Take 1,000 Units by mouth daily. glipiZIDE (GLUCOTROL) 5 mg tablet 2020 at Unknown time  Yes   Sig: Take 5 mg by mouth daily. glucosamine-chondroitin (ARTHX) 500-400 mg cap 2020 at Unknown time  Yes   Sig: Take 1-2 Caps by mouth daily. latanoprost (XALATAN) 0.005 % ophthalmic solution   Yes   Sig: Administer 1 Drop to both eyes nightly. losartan (COZAAR) 100 mg tablet 2020 at Unknown time  Yes   Sig: Take 100 mg by mouth daily. nitroglycerin (NITROSTAT) 0.4 mg SL tablet   No   Si Tab by SubLINGual route every five (5) minutes as needed for Chest Pain. therapeutic multivitamin (THERAGRAN) tablet 2020 at Unknown time  Yes   Sig: Take 1 Tab by mouth daily. tiotropium (SPIRIVA WITH HANDIHALER) 18 mcg inhalation capsule 2020 at Unknown time  Yes   Sig: Take 1 Cap by inhalation daily. valsartan-hydroCHLOROthiazide (DIOVAN HCT) 320-25 mg per tablet 2020 at Unknown time  Yes   Sig: Take 1 Tab by mouth daily. Facility-Administered Medications: None       Please contact the main inpatient pharmacy with any questions or concerns at [phone] and we will direct you to the clinical pharmacist covering this patient's care while in-house.    Melania Dee, PHARMD

## 2020-02-21 NOTE — ED NOTES
Patient eating lunch tray with family at bedside. No acute signs of distress. RN will continue to monitor.

## 2020-02-21 NOTE — ED TRIAGE NOTES
Pt arrives via EMS as a transfer from [facility] for a cardiac w/u. Initially arrived to [facility] ED as a ground level fall. A&O x 4. Received dilaudid prior to leaving  ED.      Hx of MI and Stents

## 2020-02-21 NOTE — ED PROVIDER NOTES
The patient presents to the ED with left chest pain after a fall. She fell at 7:30 PM. She fell on to the floor. She denies hitting her head or any LOC. She denies significant pain at the time of the fall. She did hit her left knee. She is unsure if she landed on her chest. She did not have pain when she fell. She woke up this AM with pain. She reports severe pain to her left chest. Pain is increased with deep breath, but she denies any shortness of breath. She denies any nausea or diaphoresis. Pain is severe, 10/10 and sharp. No meds have been taken for pain. She denies any other injuries or concerns. Of note, she had 25% of R kidney removed in December for kidney cancer. CARDS: Dr. Tatiana Gates - she reports having 4 stents. She takes 81 mg aspirin daily. The history is provided by the patient. Rib Injury   Associated symptoms include chest pain. Pertinent negatives include no fever, no headaches, no sore throat, no cough, no vomiting, no abdominal pain and no rash. Past Medical History:   Diagnosis Date    CAD (coronary artery disease)     COPD     [facility]     Gastrointestinal disorder     GERD (gastroesophageal reflux disease)     Hypertension     MI (myocardial infarction) ([facility]     Dr. Carlisle Payment Osteoporosis        Past Surgical History:   Procedure Laterality Date    CARDIAC SURG PROCEDURE UNLIST      stents x 3    HX HEENT      tonsillectomy    HX ORTHOPAEDIC      bilateral hips replaced    HX ORTHOPAEDIC      L wrist titanium gentry placed    HX ORTHOPAEDIC      carpal tunnel bilaterally    HX ORTHOPAEDIC      trigger finger release on bilateral thumbs and ring and middle finger on L hand         History reviewed. No pertinent family history.     Social History     Socioeconomic History    Marital status:      Spouse name: Not on file    Number of children: Not on file    Years of education: Not on file    Highest education level: Not on file   Occupational History    Not on file   Social Needs    Financial resource strain: Not on file    Food insecurity:     Worry: Not on file     Inability: Not on file    Transportation needs:     Medical: Not on file     Non-medical: Not on file   Tobacco Use    Smoking status: Former Smoker     Last attempt to quit: 1994     Years since quittin.8    Smokeless tobacco: Never Used   Substance and Sexual Activity    Alcohol use: Yes     Comment: rarely    Drug use: No    Sexual activity: Not on file   Lifestyle    Physical activity:     Days per week: Not on file     Minutes per session: Not on file    Stress: Not on file   Relationships    Social connections:     Talks on phone: Not on file     Gets together: Not on file     Attends [employer] service: Not on file     Active member of club or organization: Not on file     Attends meetings of clubs or organizations: Not on file     Relationship status: Not on file    Intimate partner violence:     Fear of current or ex partner: Not on file     Emotionally abused: Not on file     Physically abused: Not on file     Forced sexual activity: Not on file   Other Topics Concern    Not on file   Social History Narrative    Not on file         ALLERGIES: Atenolol and Codeine    Review of Systems   Constitutional: Negative for appetite change and fever. HENT: Negative for congestion, nosebleeds and sore throat. Eyes: Negative for discharge and visual disturbance. Respiratory: Negative for cough and shortness of breath. Cardiovascular: Positive for chest pain. Gastrointestinal: Negative for abdominal pain, diarrhea, nausea and vomiting. Genitourinary: Negative for dysuria. Musculoskeletal: Negative. Skin: Positive for wound. Negative for rash. Neurological: Negative for weakness and headaches. Hematological: Negative for adenopathy. Psychiatric/Behavioral: Negative. All other systems reviewed and are negative.       Vitals:    20 0244 BP: 148/66   Pulse: 82   Resp: 20   Temp: 97.8 °F (36.6 °C)   SpO2: 94%   Weight: 88.5 kg (195 lb 1.7 oz)   Height: 5' 5\" (1.651 m)            Physical Exam  Vitals signs and nursing note reviewed. Constitutional:       General: She is in acute distress. Appearance: Normal appearance. She is well-developed. She is obese. HENT:      Head: Normocephalic and atraumatic. Nose: Nose normal.      Mouth/Throat:      Mouth: Mucous membranes are moist.   Eyes:      Extraocular Movements: Extraocular movements intact. Conjunctiva/sclera: Conjunctivae normal.      Pupils: Pupils are equal, round, and reactive to light. Neck:      Musculoskeletal: Normal range of motion and neck supple. Cardiovascular:      Rate and Rhythm: Normal rate and regular rhythm. Pulses: Normal pulses. Heart sounds: Normal heart sounds. Pulmonary:      Effort: Pulmonary effort is normal.      Breath sounds: Rales (at the bases) present. Chest:      Chest wall: Tenderness (anterior chest and with compression. ) present. Abdominal:      General: Abdomen is flat. Bowel sounds are normal.      Palpations: Abdomen is soft. Musculoskeletal: Normal range of motion. Comments: Mild tenderness and abrasion L knee. Skin:     General: Skin is warm and dry. Capillary Refill: Capillary refill takes less than 2 seconds. Neurological:      General: No focal deficit present. Mental Status: She is alert and oriented to person, place, and time. Psychiatric:         Mood and Affect: Mood normal.         Behavior: Behavior normal.          MDM       Procedures    ED EKG interpretation:  Rhythm: normal sinus rhythm; and regular . Rate (approx.): 78; Axis: normal; P wave: normal; QRS interval: normal ; ST/T wave: non-specific changes; interpreted by Charmaine Swanson MD, ED MD.    A/P:  1. Chest pain - ? MSK from trauma vs cardiac. Plan labs. Consider chest CT.    3:21 AM  Change of shift.   Care of patient signed over to Dr. Camila Chavira  Bedside handoff complete.  Awaiting labs and re-eval.

## 2020-02-21 NOTE — PROGRESS NOTES
Problem: Mobility Impaired (Adult and Pediatric)  Goal: *Acute Goals and Plan of Care (Insert Text)  Description  FUNCTIONAL STATUS PRIOR TO ADMISSION: Patient was modified independent using a single point cane for functional mobility. HOME SUPPORT PRIOR TO ADMISSION: The patient lived with spouse but did not require assist.    Physical Therapy Goals  Initiated 2/21/2020  1. Patient will move from supine to sit and sit to supine , scoot up and down and roll side to side in bed with modified independence within 7 day(s). 2.  Patient will transfer from bed to chair and chair to bed with modified independence using the least restrictive device within 7 day(s). 3.  Patient will perform sit to stand with modified independence within 7 day(s). 4.  Patient will ambulate with modified independence for 150 feet with the least restrictive device within 7 day(s). 5.  Patient will ascend/descend 12 stairs with handrail(s) with modified independence within 7 day(s). Outcome: Not Met     PHYSICAL THERAPY EVALUATION  Patient: Avery Garcia (87 y.o. female)  Date: 2/21/2020  Primary Diagnosis: Chest pain [R07.9]  Chest pain [R07.9]  Chest pain [R07.9]  Rib fracture [S22.39XA]        Precautions:  Fall      ASSESSMENT  Based on the objective data described below, the patient presents with increased pain with activity and c/o light headedness s/p a fall down 2 steps in her home. X-ray shows no pelvic fracture. Chest CT shows nondisplaced 6th rib. BP assessed sup/sit/stand during this visit  negative for orthostatic hypotension though patient voiced increased light headedness when upright. Patient had the most difficulty transitioning positions in bed and sit<->stand. She voiced concern re: how her spouse would assist her with this at home. She did fairly well ambulating with a rolling walker though limited time up due to pain and unable to attempt steps today.   Patient is functioning below her baseline of independent with transfers and ambulation using a cane. Plan for next visit: Gait training with cane to allow patient to mobilize throughout the inside of her home and stair training. Current Level of Function Impacting Discharge (mobility/balance): Minimum assist for bed mobility; CGA for ambulation with RW    Functional Outcome Measure: The patient scored 70/100 on the Barthel outcome measure which is indicative of 30% impaired ability to care for basic self needs/dependency on others. Other factors to consider for discharge: Lives with spouse who has his own medical issues. Lives in tri level home - will need to clear steps and be able to ambulate with a cane. Patient will benefit from skilled therapy intervention to address the above noted impairments. PLAN :  Recommendations and Planned Interventions: bed mobility training, transfer training, gait training, therapeutic exercises, patient and family training/education and therapeutic activities      Frequency/Duration: Patient will be followed by physical therapy:  5 times a week to address goals. Recommendation for discharge: (in order for the patient to meet his/her long term goals)  Therapy up to 5 days/week in SNF setting or home health therapy program and additional caregiver assistance for mobility, hayes bed mobility    This discharge recommendation:  Has not yet been discussed the attending provider and/or case management    IF patient discharges home will need the following DME: patient owns a rollator walker         SUBJECTIVE:   Patient stated I couldn't walk.  citing pain at the reason    OBJECTIVE DATA SUMMARY:   HISTORY:    Past Medical History:   Diagnosis Date    CAD (coronary artery disease)     COPD     [facility]     Gastrointestinal disorder     GERD (gastroesophageal reflux disease)     Hypertension     [facility] ([facility]     Dr. Kari Lovell Osteoporosis      Past Surgical History:   Procedure Laterality Date    CARDIAC SURG PROCEDURE UNLIST      stents x 3    HX HEENT      tonsillectomy    HX NEPHRECTOMY Right     partial dec2019 for ca    HX ORTHOPAEDIC      bilateral hips replaced    HX ORTHOPAEDIC      L wrist titanium gentry placed    HX ORTHOPAEDIC      carpal tunnel bilaterally    HX ORTHOPAEDIC      trigger finger release on bilateral thumbs and ring and middle finger on L hand       Home Situation  Home Environment: Private residence  # Steps to Enter: 1  One/Two Story Residence: Two story(tri level home)  Lift Chair Available: Yes  Living Alone: No  Support Systems: Spouse/Significant Other/Partner, Family member(s), Friends \ neighbors  Current DME Used/Available at Home: Alveta Gallery, rolling, Jane Peto, straight(bed rail; shower seat; grab bar)  Tub or Shower Type: Shower    EXAMINATION/PRESENTATION/DECISION MAKING:   Critical Behavior:  Neurologic State: Alert  Orientation Level: Oriented X4     Safety/Judgement: Awareness of environment  Hearing: Auditory  Auditory Impairment: None    Range Of Motion:  AROM: Generally decreased, functional                       Strength:    Strength: Generally decreased, functional                    Tone & Sensation:   Tone: Normal              Sensation: Intact               Coordination:  Coordination: Within functional limits  Vision:   Tracking: Able to track stimulus in all quadrants w/o difficulty  Diplopia: No  Acuity: Able to read employee name badge without difficulty; Able to read clock/calendar on wall without difficulty  Corrective Lenses: Reading glasses  Functional Mobility:  Bed Mobility:  Supine to Sit: Minimum assistance  Sit to Supine: Minimum assistance  Scooting: Stand-by assistance   Transfers:  Sit to Stand: Minimum assistance;Assist x1  Stand to Sit: Contact guard assistance;Assist x1   Balance:   Sitting: Intact; Without support  Standing: Intact; With support  Ambulation/Gait Training:  Distance (ft): 50 Feet (ft)(x2)  Assistive Device: Gait belt;Walker, rolling  Ambulation - Level of Assistance: Contact guard assistance;Assist x1;Adaptive equipment     Gait Description (WDL): Exceptions to WDL           Base of Support: Widened     Speed/Helen: Slow           Interventions: Safety awareness training;Verbal cues       Functional Measure:  Barthel Index:    Bathin  Bladder: 10  Bowels: 10  Groomin  Dressin  Feeding: 10  Mobility: 5  Stairs: 5  Toilet Use: 5  Transfer (Bed to Chair and Back): 10  Total: 70/100       The Barthel ADL Index: Guidelines  1. The index should be used as a record of what a patient does, not as a record of what a patient could do. 2. The main aim is to establish degree of independence from any help, physical or verbal, however minor and for whatever reason. 3. The need for supervision renders the patient not independent. 4. A patient's performance should be established using the best available evidence. Asking the patient, friends/relatives and nurses are the usual sources, but direct observation and common sense are also important. However direct testing is not needed. 5. Usually the patient's performance over the preceding 24-48 hours is important, but occasionally longer periods will be relevant. 6. Middle categories imply that the patient supplies over 50 per cent of the effort. 7. Use of aids to be independent is allowed. Richard Turner., Barthel, D.W. (3793). Functional evaluation: the Barthel Index. [facility] (14)2. AMELIA FineF, Emy Recio., Nehal Norman., [place], [address] [place] [address] (). Measuring the change indisability after inpatient rehabilitation; comparison of the responsiveness of the Barthel Index and Functional [place] Measure. Journal of Neurology, Neurosurgery, and Psychiatry, 66(4), 481-024. Sharath Monterroso, N.J.A, Vaishali Arvizu,  CORY.GLORIA.M, & Mireya Reynaga MJENNIFER. (2004.) Assessment of post-stroke quality of life in cost-effectiveness studies:  The usefulness of the Barthel Index and the EuroQoL-5D. Quality of Life Research, 15, 558-42            Physical Therapy Evaluation Charge Determination   History Examination Presentation Decision-Making   LOW Complexity : Zero comorbidities / personal factors that will impact the outcome / POC LOW Complexity : 1-2 Standardized tests and measures addressing body structure, function, activity limitation and / or participation in recreation  LOW Complexity : Stable, uncomplicated  LOW Complexity : FOTO score of       Based on the above components, the patient evaluation is determined to be of the following complexity level: LOW     Pain Rating:  Voiced 5-6/10 pain rib area    Activity Tolerance:   Good  Please refer to the flowsheet for vital signs taken during this treatment. After treatment patient left in no apparent distress:   Supine in bed, Call bell within reach, Side rails x 3, daughter at bedside    COMMUNICATION/EDUCATION:   The patients plan of care was discussed with: Registered Nurse. Fall prevention education was provided and the patient/caregiver indicated understanding., Patient/family have participated as able in goal setting and plan of care. and Patient/family agree to work toward stated goals and plan of care.     Thank you for this referral.  Gustave Nyhan, PT   Time Calculation: 54 mins

## 2020-02-21 NOTE — PROGRESS NOTES
Reason for Admission: Chest pain; Rib Fracture                    RUR Score: TBD/ RRAT: 5- LOW                    Plan for utilizing home health: TBD                        Current Advanced Directive/Advance Care Plan: Pt does not have an AMD. CM offered [address] for assistance with completion; pt agreeable. Bren kelsey. Transition of Care Plan: CM met with pt and daughter, Olga Crane (ph#: [phone]), at bedside to discuss CM role and to assess pt needs. CM verified demographics including insurance and emergency contact information. Pt would like to add daughter, clay Martin, ph#: [phone]), and MARGUERITE Dee, ph#: [phone]) to emergency contact list; CM to update. Pt lives with spouse in a private three-story residence; there 4-5 steps to second level and 7 steps to third level. Transportation: Pt's family to provide transportation home after discharge. Pt reports a nebulizer (intermittent use), cane, and bed rail for DME use and IADLs prior to admission. Pt uses [facility] in  [employer] [place] and reports no concerns with getting medications. Pt verified PCP and reports last visit was last month. Medicare and state observation notice provided in writing to patient and/or caregiver as well as verbal explanation of the policy; signed copies to be scanned. Patients who are in outpatient status also receive the Observation notice. Pt reports no concerns for discharge at this time. CM to follow and assist with disposition needs as they arise. Care Management Interventions  PCP Verified by CM: Yes  Palliative Care Criteria Met (RRAT>21 & CHF Dx)?: No  Mode of Transport at Discharge: Other (see comment)(Family)  Transition of Care Consult (CM Consult):  Other(Initial Assessment)  MyChart Signup: No  Discharge Durable Medical Equipment: No  Physical Therapy Consult: Yes  Occupational Therapy Consult: Yes  Speech Therapy Consult: No  Current Support Network: Lives with Spouse, Own Home  Confirm Follow Up Transport: Family  The Patient and/or Patient Representative was Provided with a Choice of Provider and Agrees with the Discharge Plan?: No  Freedom of Choice List was Provided with Basic Dialogue that Supports the Patient's Individualized Plan of Care/Goals, Treatment Preferences and Shares the Quality Data Associated with the Providers?: No   Resource Information Provided?: No  Discharge Location  Discharge Placement: Home(Disposition TBD/subject to change pending care recommendations)    Kasia Ríos RN, BSN  Care Management Department

## 2020-02-21 NOTE — H&P
A/p    Left sided chest pain due to rib fracture following a fall last night. Pain control,ICS,PT and OT

## 2020-02-21 NOTE — ED NOTES
Ms. Stephen Sung was signed out to me by Dr. Carolyn Galvan. She is still having chest pain in the ER. Her first troponin was negative. She required a second dose of pain medicine. No fracture seen on x-ray. Attempted to get a CT of her chest.  However, given the fact that her kidney function is somewhat worse and she also recently had a partial nephrectomy, I have elected not to put her kidneys at risk and do a contrasted study. I will give her a dose of Lovenox. She will likely need VQ scan. Per Dr. Silvano Robles, she will be admitted to the hospital for further care. I have contacted the hospitalist for admission. Mark Head MD  4:35 AM        Pt's non-contrasted CT shows a rib fracture. I had given her a dose of Lovenox for possible PE after we were not able to do a contrasted CT. Her pain is not controlled despite 2 rounds of dilaudid. Pt. Says that she cannot walk 2/2 pain.       Mark Head MD  5:21 AM

## 2020-02-22 LAB
ALBUMIN SERPL-MCNC: 3.2 G/DL (ref 3.5–5)
ALBUMIN/GLOB SERPL: 1 {RATIO} (ref 1.1–2.2)
ALP SERPL-CCNC: 96 U/L (ref 45–117)
ALT SERPL-CCNC: 19 U/L (ref 12–78)
ANION GAP SERPL CALC-SCNC: 3 MMOL/L (ref 5–15)
AST SERPL-CCNC: 10 U/L (ref 15–37)
BASOPHILS # BLD: 0 K/UL (ref 0–0.1)
BASOPHILS NFR BLD: 0 % (ref 0–1)
BILIRUB SERPL-MCNC: 0.4 MG/DL (ref 0.2–1)
BUN SERPL-MCNC: 21 MG/DL (ref 6–20)
BUN/CREAT SERPL: 23 (ref 12–20)
CALCIUM SERPL-MCNC: 8.3 MG/DL (ref 8.5–10.1)
CHLORIDE SERPL-SCNC: 107 MMOL/L (ref 97–108)
CO2 SERPL-SCNC: 30 MMOL/L (ref 21–32)
CREAT SERPL-MCNC: 0.9 MG/DL (ref 0.55–1.02)
DIFFERENTIAL METHOD BLD: ABNORMAL
EOSINOPHIL # BLD: 0.2 K/UL (ref 0–0.4)
EOSINOPHIL NFR BLD: 4 % (ref 0–7)
ERYTHROCYTE [DISTWIDTH] IN BLOOD BY AUTOMATED COUNT: 13 % (ref 11.5–14.5)
GLOBULIN SER CALC-MCNC: 3.1 G/DL (ref 2–4)
GLUCOSE BLD STRIP.AUTO-MCNC: 141 MG/DL (ref 65–100)
GLUCOSE BLD STRIP.AUTO-MCNC: 159 MG/DL (ref 65–100)
GLUCOSE BLD STRIP.AUTO-MCNC: 166 MG/DL (ref 65–100)
GLUCOSE BLD STRIP.AUTO-MCNC: 188 MG/DL (ref 65–100)
GLUCOSE SERPL-MCNC: 144 MG/DL (ref 65–100)
HCT VFR BLD AUTO: 36.5 % (ref 35–47)
HGB BLD-MCNC: 11.8 G/DL (ref 11.5–16)
IMM GRANULOCYTES # BLD AUTO: 0 K/UL (ref 0–0.04)
IMM GRANULOCYTES NFR BLD AUTO: 0 % (ref 0–0.5)
LYMPHOCYTES # BLD: 1.6 K/UL (ref 0.8–3.5)
LYMPHOCYTES NFR BLD: 26 % (ref 12–49)
MCH RBC QN AUTO: 30 PG (ref 26–34)
MCHC RBC AUTO-ENTMCNC: 32.3 G/DL (ref 30–36.5)
MCV RBC AUTO: 92.9 FL (ref 80–99)
MONOCYTES # BLD: 0.7 K/UL (ref 0–1)
MONOCYTES NFR BLD: 10 % (ref 5–13)
NEUTS SEG # BLD: 3.8 K/UL (ref 1.8–8)
NEUTS SEG NFR BLD: 60 % (ref 32–75)
NRBC # BLD: 0 K/UL (ref 0–0.01)
NRBC BLD-RTO: 0 PER 100 WBC
PLATELET # BLD AUTO: 115 K/UL (ref 150–400)
PMV BLD AUTO: 11.2 FL (ref 8.9–12.9)
POTASSIUM SERPL-SCNC: 3.8 MMOL/L (ref 3.5–5.1)
PROT SERPL-MCNC: 6.3 G/DL (ref 6.4–8.2)
RBC # BLD AUTO: 3.93 M/UL (ref 3.8–5.2)
SERVICE CMNT-IMP: ABNORMAL
SODIUM SERPL-SCNC: 140 MMOL/L (ref 136–145)
WBC # BLD AUTO: 6.4 K/UL (ref 3.6–11)

## 2020-02-22 PROCEDURE — 74011250637 HC RX REV CODE- 250/637: Performed by: HOSPITALIST

## 2020-02-22 PROCEDURE — 74011636637 HC RX REV CODE- 636/637: Performed by: HOSPITALIST

## 2020-02-22 PROCEDURE — 97165 OT EVAL LOW COMPLEX 30 MIN: CPT

## 2020-02-22 PROCEDURE — 97535 SELF CARE MNGMENT TRAINING: CPT

## 2020-02-22 PROCEDURE — 74011000250 HC RX REV CODE- 250: Performed by: HOSPITALIST

## 2020-02-22 PROCEDURE — 74011250636 HC RX REV CODE- 250/636: Performed by: HOSPITALIST

## 2020-02-22 PROCEDURE — 85025 COMPLETE CBC W/AUTO DIFF WBC: CPT

## 2020-02-22 PROCEDURE — 80053 COMPREHEN METABOLIC PANEL: CPT

## 2020-02-22 PROCEDURE — 36415 COLL VENOUS BLD VENIPUNCTURE: CPT

## 2020-02-22 PROCEDURE — 99218 HC RM OBSERVATION: CPT

## 2020-02-22 PROCEDURE — 96375 TX/PRO/DX INJ NEW DRUG ADDON: CPT

## 2020-02-22 PROCEDURE — 96376 TX/PRO/DX INJ SAME DRUG ADON: CPT

## 2020-02-22 PROCEDURE — 82962 GLUCOSE BLOOD TEST: CPT

## 2020-02-22 RX ORDER — KETOROLAC TROMETHAMINE 30 MG/ML
15 INJECTION, SOLUTION INTRAMUSCULAR; INTRAVENOUS EVERY 6 HOURS
Status: DISCONTINUED | OUTPATIENT
Start: 2020-02-22 | End: 2020-02-22

## 2020-02-22 RX ORDER — KETOROLAC TROMETHAMINE 30 MG/ML
15 INJECTION, SOLUTION INTRAMUSCULAR; INTRAVENOUS EVERY 6 HOURS
Status: COMPLETED | OUTPATIENT
Start: 2020-02-22 | End: 2020-02-23

## 2020-02-22 RX ORDER — LIDOCAINE 4 G/100G
1 PATCH TOPICAL EVERY 24 HOURS
Status: DISCONTINUED | OUTPATIENT
Start: 2020-02-22 | End: 2020-02-22

## 2020-02-22 RX ADMIN — Medication 10 ML: at 22:02

## 2020-02-22 RX ADMIN — KETOROLAC TROMETHAMINE 15 MG: 30 INJECTION, SOLUTION INTRAMUSCULAR at 17:14

## 2020-02-22 RX ADMIN — HYDROCODONE BITARTRATE AND ACETAMINOPHEN 1 TABLET: 5; 325 TABLET ORAL at 17:12

## 2020-02-22 RX ADMIN — INSULIN LISPRO 2 UNITS: 100 INJECTION, SOLUTION INTRAVENOUS; SUBCUTANEOUS at 17:13

## 2020-02-22 RX ADMIN — HYDROCODONE BITARTRATE AND ACETAMINOPHEN 1 TABLET: 5; 325 TABLET ORAL at 08:52

## 2020-02-22 RX ADMIN — HYDROCODONE BITARTRATE AND ACETAMINOPHEN 1 TABLET: 5; 325 TABLET ORAL at 22:02

## 2020-02-22 RX ADMIN — ASPIRIN 81 MG 81 MG: 81 TABLET ORAL at 08:52

## 2020-02-22 RX ADMIN — INSULIN LISPRO 2 UNITS: 100 INJECTION, SOLUTION INTRAVENOUS; SUBCUTANEOUS at 12:11

## 2020-02-22 RX ADMIN — Medication 10 ML: at 06:55

## 2020-02-22 RX ADMIN — HYDROCODONE BITARTRATE AND ACETAMINOPHEN 1 TABLET: 5; 325 TABLET ORAL at 13:27

## 2020-02-22 RX ADMIN — ATORVASTATIN CALCIUM 20 MG: 20 TABLET, FILM COATED ORAL at 22:02

## 2020-02-22 RX ADMIN — LATANOPROST 1 DROP: 50 SOLUTION OPHTHALMIC at 22:02

## 2020-02-22 RX ADMIN — KETOROLAC TROMETHAMINE 15 MG: 30 INJECTION, SOLUTION INTRAMUSCULAR at 23:24

## 2020-02-22 RX ADMIN — HYDROCODONE BITARTRATE AND ACETAMINOPHEN 1 TABLET: 5; 325 TABLET ORAL at 03:02

## 2020-02-22 RX ADMIN — INSULIN LISPRO 2 UNITS: 100 INJECTION, SOLUTION INTRAVENOUS; SUBCUTANEOUS at 07:02

## 2020-02-22 NOTE — PROGRESS NOTES
Problem: Falls - Risk of  Goal: *Absence of Falls  Description  Document Aric Moses Fall Risk and appropriate interventions in the flowsheet.   Outcome: Progressing Towards Goal  Note: Fall Risk Interventions:  Mobility Interventions: Communicate number of staff needed for ambulation/transfer         Medication Interventions: Patient to call before getting OOB    Elimination Interventions: Call light in reach    History of Falls Interventions: Door open when patient unattended

## 2020-02-22 NOTE — PROGRESS NOTES
Bedside and Verbal shift change report given to [address] [place] [address] Blvd (oncoming nurse) by Jimi Owen RN (offgoing nurse). Report included the following information SBAR, Kardex and MAR.

## 2020-02-22 NOTE — PROGRESS NOTES
1805:  Respiratory therapist called for PRN treatment due to patient wheezing. 2011:  Bedside shift change report given to Watson Ceja RN (oncoming nurse) by Claudette Goldsmith, RN (offgoing nurse). Report included the following information SBAR, Kardex, Intake/Output, MAR and Recent Results.

## 2020-02-22 NOTE — PROGRESS NOTES
Problem: Self Care Deficits Care Plan (Adult)  Goal: *Acute Goals and Plan of Care (Insert Text)  Description    FUNCTIONAL STATUS PRIOR TO ADMISSION: mod I self care PTA, \"surgery for kidney cancer in ,\" [facility]    HOME SUPPORT:  had cardiac surgery 1 yr ago, uses lift chair and is always \"short of breath. He can't help me. \"  Stites Kitten assisted patient off floor after current fall down steps. Occupational Therapy Goals  Initiated 2/22/2020  1. Patient will perform grooming in standing VSS with modified independence within 7 day(s). 2.  Patient will perform upper body dressing and bathing with minimal assistance/contact guard assist within 7 day(s). 3.  Patient will perform lower body dressing and bathing with minimal assistance/contact guard assist within 7 day(s). 4.  Patient will perform toilet transfers with contact guard assist within 7 day(s). 5.  Patient will perform all aspects of toileting with less than 5/10 reported pain and with supervision/set-up within 7 day(s). 6.  Patient will participate in effective use of incentive spirometer and demonstrate upper extremity therapeutic exercise/activities with supervision/set-up for 5 minutes within 7 day(s). 7.  Patient will utilize energy conservation, fall prevention, pain management and PLB techniques during functional activities with verbal cues within 7 day(s). Outcome: Progressing Towards Goal   OCCUPATIONAL THERAPY EVALUATION  Patient: Praveen William (78 y.o. female)  Date: 2/22/2020  Primary Diagnosis: Chest pain [R07.9]  Chest pain [R07.9]  Chest pain [R07.9]  Rib fracture [S22.39XA]        Precautions:   Fall(L rib fx)    ASSESSMENT  Based on the objective data described below, the patient presents with poor activity tolerance related to pain in B ribs, L worse than R, poor tolerance for deep breathing and 8/10 pain, currently refusing further out of bed activity due to pain.  She did walk to bathroom with [facility] staff today, Assist x 1. Concern for possible cog change: trauma vs pain causing eg misuse of call bell vs phone, poor skill using incentive spirometer etc.     Current Level of Function Impacting Discharge (ADLs/self-care): set up-max A UE ADLs, limited by rib pain and poor deep breathing skills, D LE ADLs limited by pain; functional mobility min A-maxA limited by pain    Functional Outcome Measure: The patient scored Total: 35/100 on the Barthel Index outcome measure which is indicative of 65% impaired ability to care for basic self needs/dependency on others; inferred 100% dependency on others for instrumental ADLs. A marked decline from yesterday on admission when her score was 70/100. She reports she feels much worse today. Other factors to consider for discharge: lives with spouse who has bad health as noted     Patient will benefit from skilled therapy intervention to address the above noted impairments. PLAN :  Recommendations and Planned Interventions: self care training, functional mobility training, therapeutic exercise, balance training, therapeutic activities, cognitive retraining, endurance activities, patient education, home safety training, and family training/education    Frequency/Duration: Patient will be followed by occupational therapy 5 times a week to address goals. Recommendation for discharge: (in order for the patient to meet his/her long term goals)  Therapy up to 5 days/week in SNF setting    This discharge recommendation:  Has not yet been discussed the attending provider and/or case management    IF patient discharges home will need the following DME: BSC       SUBJECTIVE:   Patient stated I have a reacher on both levels of tri level home.     OBJECTIVE DATA SUMMARY:   HISTORY:   Past Medical History:   Diagnosis Date    CAD (coronary artery disease)     COPD     Diabetes ([facility] [address]     Gastrointestinal disorder     GERD (gastroesophageal reflux disease)     Hypertension     MI (myocardial infarction) ([facility] [address]     Dr. Valeria Zamora    Osteoporosis      Past Surgical History:   Procedure Laterality Date    CARDIAC SURG PROCEDURE UNLIST      stents x 3    HX HEENT      tonsillectomy    HX NEPHRECTOMY Right     partial dec2019 for ca    HX ORTHOPAEDIC      bilateral hips replaced    HX ORTHOPAEDIC      L wrist titanium gentry placed    HX ORTHOPAEDIC      carpal tunnel bilaterally    HX ORTHOPAEDIC      trigger finger release on bilateral thumbs and ring and middle finger on L hand       Expanded or extensive additional review of patient history:     Home Situation  Home Environment: Private residence  # Steps to Enter: 0  One/Two Story Residence: (tri level)  Lift Chair Available: No  Living Alone: No  Support Systems: Spouse/Significant Other/Partner(Jan 2019 heart surgery and \"harrd time breathhing)  Patient Expects to be Discharged to[de-identified] Rehabilitation facility  Current DME Used/Available at Home: [address] [facility], [place], straight, Shower chair, Grab bars, Adaptive dressing aides, Walker, rolling(bed rail, high bed)  Tub or Shower Type: Shower    Hand dominance: Left    EXAMINATION OF PERFORMANCE DEFICITS:  Cognitive/Behavioral Status:  Neurologic State: Alert  Orientation Level: Oriented X4  Cognition: Follows commands; Impulsive;Decreased attention/concentration(appears limited by pain)  Perception: Appears intact  Perseveration: Perseverates during conversation(regarding high pain level )  Safety/Judgement: Fall prevention;Decreased insight into deficits    Skin: see nsg notes    Edema: see nsg notes    Hearing:   Auditory  Auditory Impairment: None    Vision/Perceptual:                                Corrective Lenses: Reading glasses    Range of Motion:  B UE  AROM: Generally decreased, functional  PROM: Within functional limits                      Strength:  B UE  Strength: Generally decreased, functional in LEs but B UEs limited by pain in trunk/ribs with UE use against resistance Coordination:  Coordination: Generally decreased, functional [address], Gross motor coordination limited hayes on L due to pain  Fine Motor Skills-Upper: Left Intact; Right Intact    Gross Motor Skills-Upper: Left Impaired;Right Impaired(limited by chest/rib regional pain)    Tone & Sensation:    Tone: Normal  Sensation: Intact                      Balance:  Sitting: Impaired  Sitting - Static: Poor (constant support)  Sitting - Dynamic: Poor (constant support)  Standing: (refused to get out of bed due to pain)    Functional Mobility and Transfers for ADLs:  Bed Mobility:  Rolling: Maximum assistance(bedrail PTA)    Transfers:  Sit to Stand: Additional time; Moderate assistance;Minimum assistance; Adaptive equipment(per staff /patient report; refused due to pain now)  Bed to Chair: Total assistance(unable to sit in chair today due to pain)  Toilet Transfer : Moderate assistance;Contact guard assistance; Additional time; Adaptive equipment(by patient and staff report; rrefused now due to pain)    ADL Assessment:  Feeding: Modified independent    Oral Facial Hygiene/Grooming: Setup; Additional time    Bathing: Maximum assistance    Upper Body Dressing: Minimum assistance; Moderate assistance    Lower Body Dressing: Maximum assistance    Toileting: Minimum assistance; Moderate assistance(per staff/patient eport)                ADL Intervention and task modifications:     Initiated training of pain management with rib fx, use of call bell vs phone- trying to call nurse with phone by pushing red button on phone; use of incentive spirometer and pneumonia risks with rib fxs due to poor ability to take deep breaths; Very poor use of IS. initiated training of PLB- also with poor return demonstration    Cognitive Retraining  Safety/Judgement: Fall prevention;Decreased insight into deficits    Therapeutic Exercise:  UE AROM, incentive spirometer and PLB all encouraged to improve functional UE activity tolerance and improved activity tolerance  Functional Measure:  Barthel Index: decrease from admit    Bathin  Bladder: 10  Bowels: 0  Groomin  Dressin  Feeding: 10  Mobility: 0  Stairs: 0  Toilet Use: 5  Transfer (Bed to Chair and Back): 5  Total: 35/100        The Barthel ADL Index: Guidelines  1. The index should be used as a record of what a patient does, not as a record of what a patient could do. 2. The main aim is to establish degree of independence from any help, physical or verbal, however minor and for whatever reason. 3. The need for supervision renders the patient not independent. 4. A patient's performance should be established using the best available evidence. Asking the patient, friends/relatives and nurses are the usual sources, but direct observation and common sense are also important. However direct testing is not needed. 5. Usually the patient's performance over the preceding 24-48 hours is important, but occasionally longer periods will be relevant. 6. Middle categories imply that the patient supplies over 50 per cent of the effort. 7. Use of aids to be independent is allowed. Kailey Mcintosh., Barthel, D.W. (8201). Functional evaluation: the Barthel Index. [address] [facility] (14)2. Marilu Brito brenda ShannanNortheast Baptist HospitalJORDANA casanova, Rogerio Dempsey, Yannick Emery, [place], [facility] (). Measuring the change indisability after inpatient rehabilitation; comparison of the responsiveness of the Barthel Index and Functional [place] Measure. Journal of Neurology, Neurosurgery, and Psychiatry, 66(4), 551-670. Ekta Santillan, N.J.A, WINSTON Manzano, & Hanna Uriarte M.A. (2004.) Assessment of post-stroke quality of life in cost-effectiveness studies: The usefulness of the Barthel Index and the EuroQoL-5D.  Quality of Life Research, 15, 407-50         Occupational Therapy Evaluation Charge Determination   History Examination Decision-Making   LOW Complexity : Brief history review  HIGH Complexity : 5 or more performance deficits relating to physical, cognitive , or psychosocial skils that result in activity limitations and / or participation restrictions HIGH Complexity : Patient presents with comorbidities that affect occupational performance. Signifigant modification of tasks or assistance (eg, physical or verbal) with assessment (s) is necessary to enable patient to complete evaluation       Based on the above components, the patient evaluation is determined to be of the following complexity level: LOW   Pain Ratin/10 L ribs, and \"all over. \" pain currently making patient decline out of bed activity; medicated by nsg -patient had not asked for meds earlier    Activity Tolerance:   Poor, requires frequent rest breaks, observed SOB with activity, and max pain 8/10 limiting all activity   Please refer to the flowsheet for vital signs taken during this treatment. After treatment patient left in no apparent distress:    Supine in bed, Call bell within reach, and Side rails x 3    COMMUNICATION/EDUCATION:   The patients plan of care was discussed with: Registered Nurse. Home safety education was provided and the patient/caregiver indicated understanding., Patient/family have participated as able in goal setting and plan of care. , and Patient/family agree to work toward stated goals and plan of care. This patients plan of care is appropriate for delegation to [facility] [place].     Thank you for this referral.  Slime Gaffney OTR/L  Time Calculation: 39 mins

## 2020-02-22 NOTE — PROGRESS NOTES
TRANSFER - IN REPORT:    Verbal report received from Fela RN(name) on [id number] [facility] [address]  being received from [facility](unit) for routine progression of care      Report consisted of patients Situation, Background, Assessment and   Recommendations(SBAR). Information from the following report(s) SBAR, Kardex and MAR was reviewed with the receiving nurse. Opportunity for questions and clarification was provided. Assessment completed upon patients arrival to unit and care assumed.

## 2020-02-22 NOTE — PROGRESS NOTES
Hospitalist Progress Note      Hospital summary: 78 y.o lady with CAD s/p stents, COPD, HTN, DM, GERD, RCC s/p nephrectomy, who presents with chest pain after a ground-level fall. She was found to have left-sided rib fractures. Assessment/Plan:  Left sided rib fractures after a fall:  -continue pain control  -incentive spirometry  -therapy evals    Dehydration:  -continue IV fluids  -holding diuretics    Type 2 DM:  -SSI/POC checks    RCC s/p partial R nephrectomy    CAD s/p stents  -continue home meds    COPD: stable    HTN    Code status: full  DVT prophylaxis: sq Lovenox  Disposition: may need placement  ----------------------------------------------    CC: chest pain    S: pain is better but still significant, worse with breathing, no dyspnea, no fever/cough     Review of Systems:  Pertinent items are noted in HPI. O:  Visit Vitals  /80   Pulse 74   Temp 97.8 °F (36.6 °C)   Resp 17   Ht 5' 5\" (1.651 m)   Wt 88.5 kg (195 lb)   SpO2 97%   BMI 32.45 kg/m²       PHYSICAL EXAM:  Gen: NAD, non-toxic  HEENT: anicteric sclerae, normal conjunctiva, oropharynx clear, MM moist  Neck: supple, trachea midline, no adenopathy  Heart: RRR, no MRG, no JVD, no peripheral edema.  Left chest wall tender to palpation  Lungs: CTA b/l, non-labored respirations  Abd: soft, NT, ND, BS+  Extr: warm  Skin: dry, no rash  Neuro: CN II-XII grossly intact, normal speech, moves all extremities  Psych: normal mood, appropriate affect    No intake or output data in the 24 hours ending 02/22/20 1044     Recent labs & imaging reviewed:  Recent Results (from the past 24 hour(s))   GLUCOSE, POC    Collection Time: 02/21/20 12:05 PM   Result Value Ref Range    Glucose (POC) 193 (H) 65 - 100 mg/dL    Performed by MARYSE RON    TROPONIN I    Collection Time: 02/21/20  3:28 PM   Result Value Ref Range    Troponin-I, Qt. <0.05 <0.05 ng/mL   GLUCOSE, POC    Collection Time: 02/21/20  6:18 PM   Result Value Ref Range    Glucose (POC) 154 (H) 65 - 100 mg/dL    Performed by [facility], POC    Collection Time: 02/21/20  9:21 PM   Result Value Ref Range    Glucose (POC) 146 (H) 65 - 100 mg/dL    Performed by Piotr Naqvi    CBC WITH AUTOMATED DIFF    Collection Time: 02/22/20  3:15 AM   Result Value Ref Range    WBC 6.4 3.6 - 11.0 K/uL    RBC 3.93 3.80 - 5.20 M/uL    HGB 11.8 11.5 - 16.0 g/dL    HCT 36.5 35.0 - 47.0 %    MCV 92.9 80.0 - 99.0 FL    MCH 30.0 26.0 - 34.0 PG    MCHC 32.3 30.0 - 36.5 g/dL    RDW 13.0 11.5 - 14.5 %    PLATELET 908 (L) 470 - 400 K/uL    MPV 11.2 8.9 - 12.9 FL    NRBC 0.0 0  WBC    ABSOLUTE NRBC 0.00 0.00 - 0.01 K/uL    NEUTROPHILS 60 32 - 75 %    LYMPHOCYTES 26 12 - 49 %    MONOCYTES 10 5 - 13 %    EOSINOPHILS 4 0 - 7 %    BASOPHILS 0 0 - 1 %    IMMATURE GRANULOCYTES 0 0.0 - 0.5 %    ABS. NEUTROPHILS 3.8 1.8 - 8.0 K/UL    ABS. LYMPHOCYTES 1.6 0.8 - 3.5 K/UL    ABS. MONOCYTES 0.7 0.0 - 1.0 K/UL    ABS. EOSINOPHILS 0.2 0.0 - 0.4 K/UL    ABS. BASOPHILS 0.0 0.0 - 0.1 K/UL    ABS. IMM. GRANS. 0.0 0.00 - 0.04 K/UL    DF AUTOMATED     METABOLIC PANEL, COMPREHENSIVE    Collection Time: 02/22/20  3:15 AM   Result Value Ref Range    Sodium 140 136 - 145 mmol/L    Potassium 3.8 3.5 - 5.1 mmol/L    Chloride 107 97 - 108 mmol/L    CO2 30 21 - 32 mmol/L    Anion gap 3 (L) 5 - 15 mmol/L    Glucose 144 (H) 65 - 100 mg/dL    BUN 21 (H) 6 - 20 MG/DL    Creatinine 0.90 0.55 - 1.02 MG/DL    BUN/Creatinine ratio 23 (H) 12 - 20      GFR est AA >60 >60 ml/min/1.73m2    GFR est non-AA >60 >60 ml/min/1.73m2    Calcium 8.3 (L) 8.5 - 10.1 MG/DL    Bilirubin, total 0.4 0.2 - 1.0 MG/DL    ALT (SGPT) 19 12 - 78 U/L    AST (SGOT) 10 (L) 15 - 37 U/L    Alk.  phosphatase 96 45 - 117 U/L    Protein, total 6.3 (L) 6.4 - 8.2 g/dL    Albumin 3.2 (L) 3.5 - 5.0 g/dL    Globulin 3.1 2.0 - 4.0 g/dL    A-G Ratio 1.0 (L) 1.1 - 2.2     GLUCOSE, POC    Collection Time: 02/22/20  6:38 AM   Result Value Ref Range Glucose (POC) 159 (H) 65 - 100 mg/dL    Performed by Allison Cleaning  PCT      Recent Labs     02/22/20  0315 02/21/20  0255   WBC 6.4 11.1*   HGB 11.8 13.9   HCT 36.5 42.7   * 148*     Recent Labs     02/22/20  0315 02/21/20  0255    142   K 3.8 4.1    101   CO2 30 31   BUN 21* 32*   CREA 0.90 1.19*   * 178*   CA 8.3* 9.1     Recent Labs     02/22/20  0315 02/21/20  0255   SGOT 10* 22   ALT 19 29   AP 96 102   TBILI 0.4 0.5   TP 6.3* 7.3   ALB 3.2* 3.9   GLOB 3.1 3.4     No results for input(s): INR, PTP, APTT, INREXT in the last 72 hours. No results for input(s): FE, TIBC, PSAT, FERR in the last 72 hours. No results found for: FOL, RBCF   No results for input(s): PH, PCO2, PO2 in the last 72 hours.   Recent Labs     02/21/20  1528 02/21/20 0255   TROIQ <0.05 <0.05     Lab Results   Component Value Date/Time    Cholesterol, total 117 04/18/2014 05:35 AM    HDL Cholesterol 49 04/18/2014 05:35 AM    LDL, calculated 50.6 04/18/2014 05:35 AM    Triglyceride 87 04/18/2014 05:35 AM    CHOL/HDL Ratio 2.4 04/18/2014 05:35 AM     Lab Results   Component Value Date/Time    Glucose (POC) 159 (H) 02/22/2020 06:38 AM    Glucose (POC) 146 (H) 02/21/2020 09:21 PM    Glucose (POC) 154 (H) 02/21/2020 06:18 PM    Glucose (POC) 193 (H) 02/21/2020 12:05 PM    Glucose (POC) 225 (H) 02/21/2020 08:43 AM     Lab Results   Component Value Date/Time    Color YELLOW 02/09/2012 06:30 PM    Appearance CLEAR 02/09/2012 06:30 PM    Specific gravity 1.015 02/09/2012 06:30 PM    pH (UA) 5.0 02/09/2012 06:30 PM    Protein NEGATIVE  02/09/2012 06:30 PM    Glucose NEGATIVE  02/09/2012 06:30 PM    Ketone NEGATIVE  02/09/2012 06:30 PM    Bilirubin NEGATIVE  02/09/2012 06:30 PM    Urobilinogen 0.2 02/09/2012 06:30 PM    Nitrites NEGATIVE  02/09/2012 06:30 PM    Leukocyte Esterase NEGATIVE  02/09/2012 06:30 PM    Epithelial cells 0-5 02/09/2012 06:30 PM    Bacteria NEGATIVE  02/09/2012 06:30 PM    WBC 0-4 02/09/2012 06:30 PM    RBC 0-3 02/09/2012 06:30 PM       Med list reviewed  Current Facility-Administered Medications   Medication Dose Route Frequency    sodium chloride (NS) flush 5-40 mL  5-40 mL IntraVENous Q8H    sodium chloride (NS) flush 5-40 mL  5-40 mL IntraVENous PRN    aspirin chewable tablet 81 mg  81 mg Oral DAILY    acetaminophen (TYLENOL) tablet 650 mg  650 mg Oral Q4H PRN    HYDROcodone-acetaminophen (NORCO) 5-325 mg per tablet 1 Tab  1 Tab Oral Q4H PRN    morphine injection 1 mg  1 mg IntraVENous Q4H PRN    insulin lispro (HUMALOG) injection   SubCUTAneous AC&HS    glucose chewable tablet 16 g  4 Tab Oral PRN    glucagon (GLUCAGEN) injection 1 mg  1 mg IntraMUSCular PRN    dextrose 10% infusion 0-250 mL  0-250 mL IntraVENous PRN    lidocaine 4 % patch 1 Patch  1 Patch TransDERmal Q24H    albuterol-ipratropium (DUO-NEB) 2.5 MG-0.5 MG/3 ML  3 mL Nebulization Q6H PRN    atorvastatin (LIPITOR) tablet 20 mg  20 mg Oral QHS    latanoprost (XALATAN) 0.005 % ophthalmic solution 1 Drop  1 Drop Both Eyes QHS    nitroglycerin (NITROSTAT) tablet 0.4 mg  0.4 mg SubLINGual Q5MIN PRN     D/w pt's  over the phone per the pt's request.    Gale Tenorio MD  Internal Medicine  Date of Service: 2/22/2020

## 2020-02-23 LAB
GLUCOSE BLD STRIP.AUTO-MCNC: 134 MG/DL (ref 65–100)
GLUCOSE BLD STRIP.AUTO-MCNC: 158 MG/DL (ref 65–100)
GLUCOSE BLD STRIP.AUTO-MCNC: 166 MG/DL (ref 65–100)
GLUCOSE BLD STRIP.AUTO-MCNC: 189 MG/DL (ref 65–100)
SERVICE CMNT-IMP: ABNORMAL

## 2020-02-23 PROCEDURE — 74011636637 HC RX REV CODE- 636/637: Performed by: HOSPITALIST

## 2020-02-23 PROCEDURE — 99218 HC RM OBSERVATION: CPT

## 2020-02-23 PROCEDURE — 96376 TX/PRO/DX INJ SAME DRUG ADON: CPT

## 2020-02-23 PROCEDURE — 74011250636 HC RX REV CODE- 250/636: Performed by: HOSPITALIST

## 2020-02-23 PROCEDURE — 74011250637 HC RX REV CODE- 250/637: Performed by: HOSPITALIST

## 2020-02-23 PROCEDURE — 82962 GLUCOSE BLOOD TEST: CPT

## 2020-02-23 PROCEDURE — 74011000250 HC RX REV CODE- 250: Performed by: HOSPITALIST

## 2020-02-23 RX ADMIN — Medication 5 ML: at 08:44

## 2020-02-23 RX ADMIN — HYDROCODONE BITARTRATE AND ACETAMINOPHEN 1 TABLET: 5; 325 TABLET ORAL at 08:43

## 2020-02-23 RX ADMIN — Medication 10 ML: at 05:32

## 2020-02-23 RX ADMIN — KETOROLAC TROMETHAMINE 15 MG: 30 INJECTION, SOLUTION INTRAMUSCULAR at 05:31

## 2020-02-23 RX ADMIN — ACETAMINOPHEN 650 MG: 325 TABLET ORAL at 23:48

## 2020-02-23 RX ADMIN — ATORVASTATIN CALCIUM 20 MG: 20 TABLET, FILM COATED ORAL at 20:43

## 2020-02-23 RX ADMIN — HYDROCODONE BITARTRATE AND ACETAMINOPHEN 1 TABLET: 5; 325 TABLET ORAL at 16:40

## 2020-02-23 RX ADMIN — INSULIN LISPRO 2 UNITS: 100 INJECTION, SOLUTION INTRAVENOUS; SUBCUTANEOUS at 06:50

## 2020-02-23 RX ADMIN — HYDROCODONE BITARTRATE AND ACETAMINOPHEN 1 TABLET: 5; 325 TABLET ORAL at 20:43

## 2020-02-23 RX ADMIN — LATANOPROST 1 DROP: 50 SOLUTION OPHTHALMIC at 22:01

## 2020-02-23 RX ADMIN — INSULIN LISPRO 2 UNITS: 100 INJECTION, SOLUTION INTRAVENOUS; SUBCUTANEOUS at 12:51

## 2020-02-23 RX ADMIN — HYDROCODONE BITARTRATE AND ACETAMINOPHEN 1 TABLET: 5; 325 TABLET ORAL at 02:42

## 2020-02-23 RX ADMIN — ASPIRIN 81 MG 81 MG: 81 TABLET ORAL at 08:44

## 2020-02-23 RX ADMIN — Medication 10 ML: at 22:03

## 2020-02-23 RX ADMIN — INSULIN LISPRO 2 UNITS: 100 INJECTION, SOLUTION INTRAVENOUS; SUBCUTANEOUS at 16:40

## 2020-02-23 NOTE — PROGRESS NOTES
Problem: Falls - Risk of  Goal: *Absence of Falls  Description  Document Mila Castro Fall Risk and appropriate interventions in the flowsheet. Outcome: Progressing Towards Goal  Note: Fall Risk Interventions:  Mobility Interventions: Communicate number of staff needed for ambulation/transfer, Patient to call before getting OOB    Medication Interventions: Evaluate medications/consider consulting pharmacy, Patient to call before getting OOB, Teach patient to arise slowly    Elimination Interventions: Call light in reach, Patient to call for help with toileting needs, Toileting schedule/hourly rounds    History of Falls Interventions: Door open when patient unattended, Evaluate medications/consider consulting pharmacy    Problem: Patient Education: Go to Patient Education Activity  Goal: Patient/Family Education  Outcome: Progressing Towards Goal  Patient ambulated to bathroom with one assist, walker, and gait belt.

## 2020-02-23 NOTE — PROGRESS NOTES
Hospitalist Progress Note      Hospital summary: 78 y.o lady with CAD s/p stents, COPD, HTN, DM, GERD, RCC s/p nephrectomy, who presents with chest pain after a ground-level fall. She was found to have left-sided rib fractures. Assessment/Plan:  Left sided rib fractures after a fall:  -continue pain control. Says pain control is adequate  -incentive spirometry  -Patient wants to go to rehab as she lives in a trilevel house and is having falls says her  is sick will ask Case management to evaluate. Dehydration:  -continue IV fluids  -holding diuretics    Type 2 DM:  -SSI/POC checks    RCC s/p partial R nephrectomy    CAD s/p stents  -continue home meds    COPD: stable    HTN    Code status: full  DVT prophylaxis: sq Lovenox  Disposition: may need placement  ----------------------------------------------    CC: chest pain    S: pain is better but still significant, worse with breathing, no dyspnea, no fever/cough     Review of Systems:  Pertinent items are noted in HPI. O:  Visit Vitals  /79 (BP 1 Location: Right arm, BP Patient Position: At rest)   Pulse 68   Temp 97.4 °F (36.3 °C)   Resp 16   Ht 5' 5\" (1.651 m)   Wt 88.5 kg (195 lb)   SpO2 94%   BMI 32.45 kg/m²       PHYSICAL EXAM:  Gen: NAD, non-toxic  HEENT: anicteric sclerae, normal conjunctiva, oropharynx clear, MM moist  Neck: supple, trachea midline, no adenopathy  Heart: RRR, no MRG, no JVD, no peripheral edema.  Left chest wall tender to palpation  Lungs: CTA b/l, non-labored respirations  Abd: soft, NT, ND, BS+  Extr: warm  Skin: dry, no rash  Neuro: CN II-XII grossly intact, normal speech, moves all extremities  Psych: normal mood, appropriate affect    No intake or output data in the 24 hours ending 02/23/20 0818     Recent labs & imaging reviewed:  Recent Results (from the past 24 hour(s))   GLUCOSE, POC    Collection Time: 02/22/20 11:52 AM   Result Value Ref Range    Glucose (POC) 188 (H) 65 - 100 mg/dL    Performed by Eliezer Nath    GLUCOSE, POC    Collection Time: 02/22/20  5:03 PM   Result Value Ref Range    Glucose (POC) 166 (H) 65 - 100 mg/dL    Performed by Rachel Borjas    GLUCOSE, POC    Collection Time: 02/22/20 10:06 PM   Result Value Ref Range    Glucose (POC) 141 (H) 65 - 100 mg/dL    Performed by [address] [place] [address] Street, POC    Collection Time: 02/23/20  6:17 AM   Result Value Ref Range    Glucose (POC) 166 (H) 65 - 100 mg/dL    Performed by Og Rivera      Recent Labs     02/22/20 0315 02/21/20  0255   WBC 6.4 11.1*   HGB 11.8 13.9   HCT 36.5 42.7   * 148*     Recent Labs     02/22/20 0315 02/21/20  0255    142   K 3.8 4.1    101   CO2 30 31   BUN 21* 32*   CREA 0.90 1.19*   * 178*   CA 8.3* 9.1     Recent Labs     02/22/20  0315 02/21/20  0255   SGOT 10* 22   ALT 19 29   AP 96 102   TBILI 0.4 0.5   TP 6.3* 7.3   ALB 3.2* 3.9   GLOB 3.1 3.4     No results for input(s): INR, PTP, APTT, INREXT, INREXT in the last 72 hours. No results for input(s): FE, TIBC, PSAT, FERR in the last 72 hours. No results found for: FOL, RBCF   No results for input(s): PH, PCO2, PO2 in the last 72 hours.   Recent Labs     02/21/20  1528 02/21/20  0255   TROIQ <0.05 <0.05     Lab Results   Component Value Date/Time    Cholesterol, total 117 04/18/2014 05:35 AM    HDL Cholesterol 49 04/18/2014 05:35 AM    LDL, calculated 50.6 04/18/2014 05:35 AM    Triglyceride 87 04/18/2014 05:35 AM    CHOL/HDL Ratio 2.4 04/18/2014 05:35 AM     Lab Results   Component Value Date/Time    Glucose (POC) 166 (H) 02/23/2020 06:17 AM    Glucose (POC) 141 (H) 02/22/2020 10:06 PM    Glucose (POC) 166 (H) 02/22/2020 05:03 PM    Glucose (POC) 188 (H) 02/22/2020 11:52 AM    Glucose (POC) 159 (H) 02/22/2020 06:38 AM     Lab Results   Component Value Date/Time    Color YELLOW 02/09/2012 06:30 PM    Appearance CLEAR 02/09/2012 06:30 PM    Specific gravity 1.015 02/09/2012 06:30 PM    pH (UA) 5.0 02/09/2012 06:30 PM Protein NEGATIVE  02/09/2012 06:30 PM    Glucose NEGATIVE  02/09/2012 06:30 PM    Ketone NEGATIVE  02/09/2012 06:30 PM    Bilirubin NEGATIVE  02/09/2012 06:30 PM    Urobilinogen 0.2 02/09/2012 06:30 PM    Nitrites NEGATIVE  02/09/2012 06:30 PM    Leukocyte Esterase NEGATIVE  02/09/2012 06:30 PM    Epithelial cells 0-5 02/09/2012 06:30 PM    Bacteria NEGATIVE  02/09/2012 06:30 PM    WBC 0-4 02/09/2012 06:30 PM    RBC 0-3 02/09/2012 06:30 PM       Med list reviewed  Current Facility-Administered Medications   Medication Dose Route Frequency    sodium chloride (NS) flush 5-40 mL  5-40 mL IntraVENous Q8H    sodium chloride (NS) flush 5-40 mL  5-40 mL IntraVENous PRN    aspirin chewable tablet 81 mg  81 mg Oral DAILY    acetaminophen (TYLENOL) tablet 650 mg  650 mg Oral Q4H PRN    HYDROcodone-acetaminophen (NORCO) 5-325 mg per tablet 1 Tab  1 Tab Oral Q4H PRN    morphine injection 1 mg  1 mg IntraVENous Q4H PRN    insulin lispro (HUMALOG) injection   SubCUTAneous AC&HS    glucose chewable tablet 16 g  4 Tab Oral PRN    glucagon (GLUCAGEN) injection 1 mg  1 mg IntraMUSCular PRN    dextrose 10% infusion 0-250 mL  0-250 mL IntraVENous PRN    lidocaine 4 % patch 1 Patch  1 Patch TransDERmal Q24H    albuterol-ipratropium (DUO-NEB) 2.5 MG-0.5 MG/3 ML  3 mL Nebulization Q6H PRN    atorvastatin (LIPITOR) tablet 20 mg  20 mg Oral QHS    latanoprost (XALATAN) 0.005 % ophthalmic solution 1 Drop  1 Drop Both Eyes QHS    nitroglycerin (NITROSTAT) tablet 0.4 mg  0.4 mg SubLINGual Q5MIN PRN     D/w pt's  over the phone per the pt's request.    Kalie Gonzalez MD  Internal Medicine  Date of Service: 2/23/2020

## 2020-02-23 NOTE — PROGRESS NOTES
3345:  Per Dr. Yusef Rome place order for care management consult for patient to be discharged to rehab facility. Order placed. Will continue to monitor patient. 2004:  Bedside shift change report given to Aracelis Payne RN (oncoming nurse) by Luis Alberto Mims RN (offgoing nurse). Report included the following information SBAR, Kardex, Intake/Output, MAR and Recent Results.

## 2020-02-23 NOTE — PROGRESS NOTES
CM reviewed chart and reviewed consult for discharge planning and possible rehab. CM met with pt to discuss options and offer freedom of choice. Pt stated that her  has been to [facility] in the past, so she would go there for rehab if thry accept her. Pt also said she would prefer [facility] if she needed SNF, CM informed her that since she was Observation, her insurance would not cover a short SNF stay, and patient said that she could not go if her insurance didn't cover it. So we discussed trying for [facility], and if that didn't work, pt's only option would be to return home. Pt does not feel safe returning home at this time. [facility].   Zion Scott, BSW, ACM

## 2020-02-24 ENCOUNTER — APPOINTMENT (OUTPATIENT)
Dept: GENERAL RADIOLOGY | Age: 80
DRG: 185 | End: 2020-02-24
Attending: HOSPITALIST
Payer: MEDICARE

## 2020-02-24 LAB
ANION GAP SERPL CALC-SCNC: 3 MMOL/L (ref 5–15)
BUN SERPL-MCNC: 23 MG/DL (ref 6–20)
BUN/CREAT SERPL: 26 (ref 12–20)
CALCIUM SERPL-MCNC: 8.7 MG/DL (ref 8.5–10.1)
CHLORIDE SERPL-SCNC: 107 MMOL/L (ref 97–108)
CO2 SERPL-SCNC: 28 MMOL/L (ref 21–32)
CREAT SERPL-MCNC: 0.87 MG/DL (ref 0.55–1.02)
ERYTHROCYTE [DISTWIDTH] IN BLOOD BY AUTOMATED COUNT: 12.8 % (ref 11.5–14.5)
GLUCOSE BLD STRIP.AUTO-MCNC: 131 MG/DL (ref 65–100)
GLUCOSE BLD STRIP.AUTO-MCNC: 168 MG/DL (ref 65–100)
GLUCOSE BLD STRIP.AUTO-MCNC: 183 MG/DL (ref 65–100)
GLUCOSE BLD STRIP.AUTO-MCNC: 183 MG/DL (ref 65–100)
GLUCOSE SERPL-MCNC: 136 MG/DL (ref 65–100)
HCT VFR BLD AUTO: 37.4 % (ref 35–47)
HGB BLD-MCNC: 11.9 G/DL (ref 11.5–16)
MCH RBC QN AUTO: 29.5 PG (ref 26–34)
MCHC RBC AUTO-ENTMCNC: 31.8 G/DL (ref 30–36.5)
MCV RBC AUTO: 92.6 FL (ref 80–99)
NRBC # BLD: 0 K/UL (ref 0–0.01)
NRBC BLD-RTO: 0 PER 100 WBC
PLATELET # BLD AUTO: 118 K/UL (ref 150–400)
PMV BLD AUTO: 11.4 FL (ref 8.9–12.9)
POTASSIUM SERPL-SCNC: 4.1 MMOL/L (ref 3.5–5.1)
RBC # BLD AUTO: 4.04 M/UL (ref 3.8–5.2)
SERVICE CMNT-IMP: ABNORMAL
SODIUM SERPL-SCNC: 138 MMOL/L (ref 136–145)
WBC # BLD AUTO: 8.1 K/UL (ref 3.6–11)

## 2020-02-24 PROCEDURE — 80048 BASIC METABOLIC PNL TOTAL CA: CPT

## 2020-02-24 PROCEDURE — 97530 THERAPEUTIC ACTIVITIES: CPT

## 2020-02-24 PROCEDURE — 99218 HC RM OBSERVATION: CPT

## 2020-02-24 PROCEDURE — 97535 SELF CARE MNGMENT TRAINING: CPT

## 2020-02-24 PROCEDURE — 65270000029 HC RM PRIVATE

## 2020-02-24 PROCEDURE — 74011000250 HC RX REV CODE- 250: Performed by: HOSPITALIST

## 2020-02-24 PROCEDURE — 71046 X-RAY EXAM CHEST 2 VIEWS: CPT

## 2020-02-24 PROCEDURE — 77030040361 HC SLV COMPR DVT MDII -B

## 2020-02-24 PROCEDURE — 82962 GLUCOSE BLOOD TEST: CPT

## 2020-02-24 PROCEDURE — 74011250637 HC RX REV CODE- 250/637: Performed by: HOSPITALIST

## 2020-02-24 PROCEDURE — 97116 GAIT TRAINING THERAPY: CPT

## 2020-02-24 PROCEDURE — 85027 COMPLETE CBC AUTOMATED: CPT

## 2020-02-24 PROCEDURE — 36415 COLL VENOUS BLD VENIPUNCTURE: CPT

## 2020-02-24 PROCEDURE — 51798 US URINE CAPACITY MEASURE: CPT

## 2020-02-24 PROCEDURE — 74011636637 HC RX REV CODE- 636/637: Performed by: HOSPITALIST

## 2020-02-24 PROCEDURE — 77010033678 HC OXYGEN DAILY

## 2020-02-24 RX ADMIN — HYDROCODONE BITARTRATE AND ACETAMINOPHEN 1 TABLET: 5; 325 TABLET ORAL at 20:13

## 2020-02-24 RX ADMIN — INSULIN LISPRO 2 UNITS: 100 INJECTION, SOLUTION INTRAVENOUS; SUBCUTANEOUS at 17:35

## 2020-02-24 RX ADMIN — INSULIN LISPRO 2 UNITS: 100 INJECTION, SOLUTION INTRAVENOUS; SUBCUTANEOUS at 06:42

## 2020-02-24 RX ADMIN — Medication 10 ML: at 21:52

## 2020-02-24 RX ADMIN — Medication 10 ML: at 05:29

## 2020-02-24 RX ADMIN — HYDROCODONE BITARTRATE AND ACETAMINOPHEN 1 TABLET: 5; 325 TABLET ORAL at 09:50

## 2020-02-24 RX ADMIN — ASPIRIN 81 MG 81 MG: 81 TABLET ORAL at 09:50

## 2020-02-24 RX ADMIN — Medication 10 ML: at 14:34

## 2020-02-24 RX ADMIN — HYDROCODONE BITARTRATE AND ACETAMINOPHEN 1 TABLET: 5; 325 TABLET ORAL at 14:25

## 2020-02-24 RX ADMIN — LATANOPROST 1 DROP: 50 SOLUTION OPHTHALMIC at 21:51

## 2020-02-24 RX ADMIN — HYDROCODONE BITARTRATE AND ACETAMINOPHEN 1 TABLET: 5; 325 TABLET ORAL at 05:29

## 2020-02-24 RX ADMIN — ATORVASTATIN CALCIUM 20 MG: 20 TABLET, FILM COATED ORAL at 21:51

## 2020-02-24 RX ADMIN — HYDROCODONE BITARTRATE AND ACETAMINOPHEN 1 TABLET: 5; 325 TABLET ORAL at 00:31

## 2020-02-24 RX ADMIN — INSULIN LISPRO 2 UNITS: 100 INJECTION, SOLUTION INTRAVENOUS; SUBCUTANEOUS at 12:59

## 2020-02-24 NOTE — PROGRESS NOTES
Bedside and Verbal shift change report given to Jennifer (oncoming nurse) by Martin Bundy (offgoing nurse). Report included the following information SBAR, Kardex, Intake/Output and MAR.

## 2020-02-24 NOTE — PROGRESS NOTES
Problem: Falls - Risk of  Goal: *Absence of Falls  Description  Document Belén Burleson Fall Risk and appropriate interventions in the flowsheet. Outcome: Progressing Towards Goal  Note:   Fall Risk Interventions:  Mobility Interventions: Communicate number of staff needed for ambulation/transfer    Medication Interventions: Patient to call before getting OOB, Teach patient to arise slowly    Elimination Interventions: Call light in reach, Patient to call for help with toileting needs, Stay With Me (per policy)    History of Falls Interventions:  Investigate reason for fall

## 2020-02-24 NOTE — PROGRESS NOTES
Problem: Mobility Impaired (Adult and Pediatric)  Goal: *Acute Goals and Plan of Care (Insert Text)  Description  FUNCTIONAL STATUS PRIOR TO ADMISSION: Patient was modified independent using a single point cane for functional mobility. HOME SUPPORT PRIOR TO ADMISSION: The patient lived with spouse but did not require assist.    Physical Therapy Goals  Initiated 2/21/2020  1. Patient will move from supine to sit and sit to supine , scoot up and down and roll side to side in bed with modified independence within 7 day(s). 2.  Patient will transfer from bed to chair and chair to bed with modified independence using the least restrictive device within 7 day(s). 3.  Patient will perform sit to stand with modified independence within 7 day(s). 4.  Patient will ambulate with modified independence for 150 feet with the least restrictive device within 7 day(s). 5.  Patient will ascend/descend 12 stairs with handrail(s) with modified independence within 7 day(s). Outcome: Progressing Towards Goal  PHYSICAL THERAPY TREATMENT  Patient: Sebastian Driver (59 y.o. female)  Date: 2/24/2020  Diagnosis: Chest pain [R07.9]  Chest pain [R07.9]  Chest pain [R07.9]  Rib fracture [S22.39XA]  Chest pain [R07.9]   <principal problem not specified>       Precautions: Fall(L rib fx)  Chart, physical therapy assessment, plan of care and goals were reviewed. ASSESSMENT  Patient continues with skilled PT services and is progressing towards goals. Received on 2 L NC at 100%. Continues to require Mod A to complete bed mobility w/ HOB elevated. Mobility remains slow d/t L rib fx. She was able to tolerate sitting EOB on RA w/ O2 remaining in mid-upper 90's. She amb short distance this afternoon w/ RW and CGA while on RA. Noted upon return to room and prior to voiding in BSC, SpO2 88% on RA. PLB encouraged and NC returned. Encouraged pt to sit up in chair for dinner this evening as she declined sitting up at this time. Current Level of Function Impacting Discharge (mobility/balance): Mod-CGA     Other factors to consider for discharge:  Lives with spouse who has his own medical issues. Lives in tri level home - will need to clear steps and be able to ambulate with a cane. PLAN :  Patient continues to benefit from skilled intervention to address the above impairments. Continue treatment per established plan of care. to address goals. Recommendation for discharge: (in order for the patient to meet his/her long term goals)  Therapy up to 5 days/week in SNF setting    This discharge recommendation:  Has been made in collaboration with the attending provider and/or case management    IF patient discharges home will need the following DME: to be determined (TBD)       SUBJECTIVE:   Patient stated Zena Brace are we going? [facility]    OBJECTIVE DATA SUMMARY:   Critical Behavior:  Neurologic State: Alert, Appropriate for age  Orientation Level: Oriented X4  Cognition: Appropriate decision making, Appropriate for age attention/concentration, Appropriate safety awareness, Follows commands  Safety/Judgement: Fall prevention, Decreased insight into deficits  Functional Mobility Training:  Bed Mobility:     Supine to Sit: Moderate assistance; Additional time;Assist x1;Bed Modified(HOB elevated, pt using bed rails)  Sit to Supine: Moderate assistance;Assist x1(BLE's into bed)  Scooting: Supervision        Transfers:  Sit to Stand: Contact guard assistance  Stand to Sit: Contact guard assistance                             Balance:  Sitting: Intact  Sitting - Static: Good (unsupported)  Sitting - Dynamic: Fair (occasional)  Standing: Impaired  Standing - Static: Constant support;Good  Standing - Dynamic : Constant support; Fair  Ambulation/Gait Training:  Distance (ft): 40 Feet (ft)  Assistive Device: Gait belt;Walker, rolling  Ambulation - Level of Assistance: Contact guard assistance                 Base of Support: Widened Speed/Helen: Pace decreased (<100 feet/min); Slow            Therapeutic Exercises:   PLB  Pain Ratin-9/10    Activity Tolerance:   Required brief standing rest break during gait. O2 desat to 88-89% at on RA, otherwise O2 mid 90's. Please refer to the flowsheet for vital signs taken during this treatment.     After treatment patient left in no apparent distress:   Supine in bed, Call bell within reach, and Side rails x 3    COMMUNICATION/COLLABORATION:   The patients plan of care was discussed with: Registered Nurse    Jd Gaming,PTA   Time Calculation: 34 mins

## 2020-02-24 NOTE — PROGRESS NOTES
Problem: Self Care Deficits Care Plan (Adult)  Goal: *Acute Goals and Plan of Care (Insert Text)  Description    FUNCTIONAL STATUS PRIOR TO ADMISSION: mod I self care PTA, \"surgery for kidney cancer in ,\" [address]    HOME SUPPORT:  had cardiac surgery 1 yr ago, uses lift chair and is always \"short of breath. He can't help me. \"  Veldon Deep assisted patient off floor after current fall down steps. Occupational Therapy Goals  Initiated 2/22/2020  1. Patient will perform grooming in standing VSS with modified independence within 7 day(s). 2.  Patient will perform upper body dressing and bathing with minimal assistance/contact guard assist within 7 day(s). 3.  Patient will perform lower body dressing and bathing with minimal assistance/contact guard assist within 7 day(s). 4.  Patient will perform toilet transfers with contact guard assist within 7 day(s). 5.  Patient will perform all aspects of toileting with less than 5/10 reported pain and with supervision/set-up within 7 day(s). 6.  Patient will participate in effective use of incentive spirometer and demonstrate upper extremity therapeutic exercise/activities with supervision/set-up for 5 minutes within 7 day(s). 7.  Patient will utilize energy conservation, fall prevention, pain management and PLB techniques during functional activities with verbal cues within 7 day(s). Outcome: Progressing Towards Goal   OCCUPATIONAL THERAPY TREATMENT  Patient: Mariaa Strong (46 y.o. female)  Date: 2/24/2020  Diagnosis: Chest pain [R07.9]  Chest pain [R07.9]  Chest pain [R07.9]  Rib fracture [S22.39XA]  Chest pain [R07.9]   <principal problem not specified>       Precautions: Fall(L rib fx)  Chart, occupational therapy assessment, plan of care, and goals were reviewed. ASSESSMENT  Patient continues with skilled OT services and is progressing towards goals.   Patient agreeable to toileting tasks however requesting to return to bed following and defer up in chair. Completed supine > sit with mod A secondary rib pain and ANAYA secondary to shallow breathing. Once up she was able to complete sit <> stand and transfer with RW with CGA. Deferred up in chair despite education on importance of need for time OOB. Current Level of Function Impacting Discharge (ADLs): toileting transfer CGA, toileting hygiene SBA, toileting clothing mgmt max A    Other factors to consider for discharge: Recommend dc to rehab as she is below her baseline and unsafe for dc home as  is unable to provide much physical assistance. PLAN :  Patient continues to benefit from skilled intervention to address the above impairments. Continue treatment per established plan of care. to address goals. Recommend with staff: up in chair for meals     Recommend next OT session: ambulate to bathroom    Recommendation for discharge: (in order for the patient to meet his/her long term goals)  Therapy up to 5 days/week in SNF setting    This discharge recommendation:  Has been made in collaboration with the attending provider and/or case management    IF patient discharges home will need the following DME: TBD SNF       SUBJECTIVE:   Patient stated I have a lot of trouble getting out of bed. Are you going to help me?     OBJECTIVE DATA SUMMARY:   Cognitive/Behavioral Status:                      Functional Mobility and Transfers for ADLs:  Bed Mobility:  Supine to Sit: Moderate assistance(d/t rib pain)  Sit to Supine: Moderate assistance(d/t rib pain)  Scooting: Supervision    Transfers:  Sit to Stand: Contact guard assistance  Functional Transfers  Toilet Transfer : Minimum assistance       Balance:  Sitting: Intact  Sitting - Static: Good (unsupported)  Sitting - Dynamic: Fair (occasional)  Standing: Impaired  Standing - Static: Constant support; Fair  Standing - Dynamic : Constant support; Fair    ADL Intervention:   Patient instructed and indicated understanding the benefits of maintaining activity tolerance, functional mobility, and independence with self care tasks during acute stay  to ensure safe return home and to baseline. Encouraged patient to increase frequency and duration OOB, be out of bed for all meals, perform daily ADLs (as approved by RN/MD regarding bathing etc), and performing functional mobility to/from bathroom. Ed increasing pulmonary function with focus on PLB, incentive spirometer, time OOB and posture, patient with fair understanding. Resistant to OOB secondary to pain. Grooming  Washing Hands: Set-up(seated)    Lower Body Dressing Assistance  Protective Undergarmet: Maximum assistance    Toileting  Bladder Hygiene: Contact guard assistance  Clothing Management: Maximum assistance(tabbed brief)      Pain:  Rib pain with activity, resolving with rest.  Provided ice for pain mgmt and discussed with nursing. Activity Tolerance:   Fair  Please refer to the flowsheet for vital signs taken during this treatment.     After treatment patient left in no apparent distress:   Supine in bed and Call bell within reach    COMMUNICATION/COLLABORATION:   The patients plan of care was discussed with: Registered Nurse    Sheri Luciano OT  Time Calculation: 28 mins

## 2020-02-24 NOTE — PROGRESS NOTES
RUR 10%    NANCY: SNF placement. Referral sent to [facility] via [employer] [facility] and accepted. [facility] denied admission. The patient will be ready for discharge on 2/27. The patient was updated an in agreement with the discharge plan. [address] letter placed on the chart. [facility] accepted for SNF, patient and  updated. CM offered screening for Medicaid Long-Term Services & Supports. Patient declined.        Saima Stephenson, [address]   [phone]

## 2020-02-24 NOTE — PHYSICIAN ADVISORY
Letter of Status Determination:  
Recommend hospitalization status upgraded from OBSERVATION  to INPATIENT  Status Pt Name:  Daniella Tapia MR#  
MAIA # [id number] / 
[id number] Payor: Dariel Sahu / Plan: [address] / Product Type: Medicare /   
RYLIE#  [id number] Room and Hospital  555/01  @ [facility]. [facility]  
Hospitalization date  2/21/2020  2:35 AM  
Current Attending Physician  Christine Booth MD  
Principal diagnosis  Chest pain [R07.9] Chest pain [R07.9] Chest pain [R07.9] Rib fracture [S22.39XA] Clinicals  78 y.o. y.o  female hospitalized with above diagnosis This pt remains in acute care setting while receiving Rx for Anterior aspect of Sixth rib fracture. She was also suffering from dehydration, for which IVF Rx was provided. PT OT and other services continue to provide Rx and supportive care. Due to appropriate and necessary medical care, this pt's hospitalization has now exceeded two midnights . [place] ([facility]) criteria Does  NOT apply STATUS DETERMINATION  This patient is at above high risk of deterioration based on documented presenting clinical data, comorbid conditions, high risk of adverse events and current acute care course. Ms. Daniella Tapia now meets Inpatient Admission status criteria in accordance with CMS regulation Section 43 .3. Specifically, due to medical necessity the patient's stay now exceeds Two Midnights. It is our recommendation that this patient's hospitalization status should be upgraded from  OBSERVATION to INPATIENT status. The final decision of the patient's hospitalization status depends on the attending physician's judgment Additional comments Payor: [employer] MEDICARE / Plan: [address] / Product Type: Medicare / The information in this document is a recommendation to be used for utilization review and utilization management purposes only. This recommendation is not an order. The recommendation is made based on the information reviewed at the time of the referral, is pursuant to the [place] [facility] Conditions of Participation (42 CFR Part 482), and is neither a judgment nor an assessment with regard to the appropriateness or quality of clinical care. For all Managed Care patients: The Criteria are intended solely for use as screening guidelines with respect to the medical appropriateness of healthcare services and not for final clinical or payment determinations concerning the type or level of medical care provided, or proposed to be provided, to a patient. They help the reviewers determine whether a patient is appropriate for observation or inpatient admission at the time a decision to admit the patient is being made. All efforts are made to apply the pertinent payor criteria ([employer] or [employer]) as well as the clinical judgements based on the information reviewed at the time of the referral.\" Nothing in this document may be used to limit, alter, or affect clinical services provided to the patient named below. Cm Loaiza MD MPH [facility] Cell: [phone] Physician Advisor [address] [place] [address]  [phone]   
 
 
[phone] Kristy Zelaya

## 2020-02-25 LAB
ANION GAP SERPL CALC-SCNC: 5 MMOL/L (ref 5–15)
BUN SERPL-MCNC: 22 MG/DL (ref 6–20)
BUN/CREAT SERPL: 24 (ref 12–20)
CALCIUM SERPL-MCNC: 9.2 MG/DL (ref 8.5–10.1)
CHLORIDE SERPL-SCNC: 105 MMOL/L (ref 97–108)
CO2 SERPL-SCNC: 28 MMOL/L (ref 21–32)
CREAT SERPL-MCNC: 0.9 MG/DL (ref 0.55–1.02)
ERYTHROCYTE [DISTWIDTH] IN BLOOD BY AUTOMATED COUNT: 12.7 % (ref 11.5–14.5)
GLUCOSE BLD STRIP.AUTO-MCNC: 149 MG/DL (ref 65–100)
GLUCOSE BLD STRIP.AUTO-MCNC: 163 MG/DL (ref 65–100)
GLUCOSE BLD STRIP.AUTO-MCNC: 163 MG/DL (ref 65–100)
GLUCOSE BLD STRIP.AUTO-MCNC: 189 MG/DL (ref 65–100)
GLUCOSE SERPL-MCNC: 148 MG/DL (ref 65–100)
HCT VFR BLD AUTO: 41.9 % (ref 35–47)
HGB BLD-MCNC: 13.5 G/DL (ref 11.5–16)
MCH RBC QN AUTO: 30.2 PG (ref 26–34)
MCHC RBC AUTO-ENTMCNC: 32.2 G/DL (ref 30–36.5)
MCV RBC AUTO: 93.7 FL (ref 80–99)
NRBC # BLD: 0 K/UL (ref 0–0.01)
NRBC BLD-RTO: 0 PER 100 WBC
PLATELET # BLD AUTO: 129 K/UL (ref 150–400)
PMV BLD AUTO: 11.3 FL (ref 8.9–12.9)
POTASSIUM SERPL-SCNC: 4.3 MMOL/L (ref 3.5–5.1)
RBC # BLD AUTO: 4.47 M/UL (ref 3.8–5.2)
SERVICE CMNT-IMP: ABNORMAL
SODIUM SERPL-SCNC: 138 MMOL/L (ref 136–145)
WBC # BLD AUTO: 8 K/UL (ref 3.6–11)

## 2020-02-25 PROCEDURE — 80048 BASIC METABOLIC PNL TOTAL CA: CPT

## 2020-02-25 PROCEDURE — 74011000250 HC RX REV CODE- 250: Performed by: HOSPITALIST

## 2020-02-25 PROCEDURE — 97535 SELF CARE MNGMENT TRAINING: CPT

## 2020-02-25 PROCEDURE — 65270000029 HC RM PRIVATE

## 2020-02-25 PROCEDURE — 74011250637 HC RX REV CODE- 250/637: Performed by: HOSPITALIST

## 2020-02-25 PROCEDURE — 74011636637 HC RX REV CODE- 636/637: Performed by: HOSPITALIST

## 2020-02-25 PROCEDURE — 82962 GLUCOSE BLOOD TEST: CPT

## 2020-02-25 PROCEDURE — 85027 COMPLETE CBC AUTOMATED: CPT

## 2020-02-25 PROCEDURE — 36415 COLL VENOUS BLD VENIPUNCTURE: CPT

## 2020-02-25 RX ORDER — OXYCODONE HYDROCHLORIDE 5 MG/1
5-10 TABLET ORAL
Status: DISCONTINUED | OUTPATIENT
Start: 2020-02-25 | End: 2020-02-27 | Stop reason: HOSPADM

## 2020-02-25 RX ORDER — LIDOCAINE 4 G/100G
2 PATCH TOPICAL EVERY 24 HOURS
Status: DISCONTINUED | OUTPATIENT
Start: 2020-02-26 | End: 2020-02-27 | Stop reason: HOSPADM

## 2020-02-25 RX ORDER — ACETAMINOPHEN 500 MG
1000 TABLET ORAL 2 TIMES DAILY
Status: DISCONTINUED | OUTPATIENT
Start: 2020-02-25 | End: 2020-02-27 | Stop reason: HOSPADM

## 2020-02-25 RX ADMIN — ACETAMINOPHEN 1000 MG: 500 TABLET ORAL at 18:07

## 2020-02-25 RX ADMIN — INSULIN LISPRO 2 UNITS: 100 INJECTION, SOLUTION INTRAVENOUS; SUBCUTANEOUS at 18:07

## 2020-02-25 RX ADMIN — INSULIN LISPRO 2 UNITS: 100 INJECTION, SOLUTION INTRAVENOUS; SUBCUTANEOUS at 12:49

## 2020-02-25 RX ADMIN — ATORVASTATIN CALCIUM 20 MG: 20 TABLET, FILM COATED ORAL at 21:48

## 2020-02-25 RX ADMIN — LATANOPROST 1 DROP: 50 SOLUTION OPHTHALMIC at 22:00

## 2020-02-25 RX ADMIN — HYDROCODONE BITARTRATE AND ACETAMINOPHEN 1 TABLET: 5; 325 TABLET ORAL at 05:45

## 2020-02-25 RX ADMIN — Medication 10 ML: at 14:15

## 2020-02-25 RX ADMIN — INSULIN LISPRO 2 UNITS: 100 INJECTION, SOLUTION INTRAVENOUS; SUBCUTANEOUS at 07:42

## 2020-02-25 RX ADMIN — Medication 10 ML: at 21:49

## 2020-02-25 RX ADMIN — ASPIRIN 81 MG 81 MG: 81 TABLET ORAL at 09:14

## 2020-02-25 NOTE — PROGRESS NOTES
Hospitalist Progress Note      Hospital summary: 78 y.o lady with CAD s/p stents, COPD, HTN, DM, GERD, RCC s/p nephrectomy, who presents with chest pain after a ground-level fall. She was found to have left-sided rib fractures. Assessment/Plan:  Left sided rib fractures after a fall:  -continue pain control. Says pain control is adequate  -incentive spirometry  -Patient wants to go to rehab as she lives in a trilevel house and is having falls says her  is sick will ask Case management to evaluate. Dehydration:  -Better with IVF  -Serial BMP monitoring    Type 2 DM:  -SSI/POC checks    RCC s/p partial R nephrectomy    CAD s/p stents  -continue home meds    COPD: stable    HTN    Code status: full  DVT prophylaxis: sq Lovenox  Disposition: may need placement  ----------------------------------------------    CC: chest pain    S: pain is better but still significant, worse with breathing, no dyspnea, no fever/cough     Review of Systems:  Pertinent items are noted in HPI. O:  Visit Vitals  /66 (BP 1 Location: Right arm)   Pulse 79   Temp 97.5 °F (36.4 °C)   Resp 15   Ht 5' 5\" (1.651 m)   Wt 88.5 kg (195 lb)   SpO2 99%   BMI 32.45 kg/m²       PHYSICAL EXAM:  Gen: NAD, non-toxic  HEENT: anicteric sclerae, normal conjunctiva, oropharynx clear, MM moist  Neck: supple, trachea midline, no adenopathy  Heart: RRR, no MRG, no JVD, no peripheral edema.  Left chest wall tender to palpation  Lungs: CTA b/l, non-labored respirations  Abd: soft, NT, ND, BS+  Extr: warm  Skin: dry, no rash  Neuro: CN II-XII grossly intact, normal speech, moves all extremities  Psych: normal mood, appropriate affect    No intake or output data in the 24 hours ending 02/24/20 1957     Recent labs & imaging reviewed:  Recent Results (from the past 24 hour(s))   GLUCOSE, POC    Collection Time: 02/23/20 10:15 PM   Result Value Ref Range    Glucose (POC) 134 (H) 65 - 100 mg/dL    Performed by [employer]    METABOLIC PANEL, BASIC    Collection Time: 02/24/20  2:21 AM   Result Value Ref Range    Sodium 138 136 - 145 mmol/L    Potassium 4.1 3.5 - 5.1 mmol/L    Chloride 107 97 - 108 mmol/L    CO2 28 21 - 32 mmol/L    Anion gap 3 (L) 5 - 15 mmol/L    Glucose 136 (H) 65 - 100 mg/dL    BUN 23 (H) 6 - 20 MG/DL    Creatinine 0.87 0.55 - 1.02 MG/DL    BUN/Creatinine ratio 26 (H) 12 - 20      GFR est AA >60 >60 ml/min/1.73m2    GFR est non-AA >60 >60 ml/min/1.73m2    Calcium 8.7 8.5 - 10.1 MG/DL   CBC W/O DIFF    Collection Time: 02/24/20  2:21 AM   Result Value Ref Range    WBC 8.1 3.6 - 11.0 K/uL    RBC 4.04 3.80 - 5.20 M/uL    HGB 11.9 11.5 - 16.0 g/dL    HCT 37.4 35.0 - 47.0 %    MCV 92.6 80.0 - 99.0 FL    MCH 29.5 26.0 - 34.0 PG    MCHC 31.8 30.0 - 36.5 g/dL    RDW 12.8 11.5 - 14.5 %    PLATELET 653 (L) 278 - 400 K/uL    MPV 11.4 8.9 - 12.9 FL    NRBC 0.0 0  WBC    ABSOLUTE NRBC 0.00 0.00 - 0.01 K/uL   GLUCOSE, POC    Collection Time: 02/24/20  6:12 AM   Result Value Ref Range    Glucose (POC) 168 (H) 65 - 100 mg/dL    Performed by [address] [place] [address], POC    Collection Time: 02/24/20 12:41 PM   Result Value Ref Range    Glucose (POC) 183 (H) 65 - 100 mg/dL    Performed by Olivier Booker, POC    Collection Time: 02/24/20  4:58 PM   Result Value Ref Range    Glucose (POC) 183 (H) 65 - 100 mg/dL    Performed by Nadia Ayala      Recent Labs     02/24/20 0221 02/22/20 0315   WBC 8.1 6.4   HGB 11.9 11.8   HCT 37.4 36.5   * 115*     Recent Labs     02/24/20 0221 02/22/20 0315    140   K 4.1 3.8    107   CO2 28 30   BUN 23* 21*   CREA 0.87 0.90   * 144*   CA 8.7 8.3*     Recent Labs     02/22/20 0315   SGOT 10*   ALT 19   AP 96   TBILI 0.4   TP 6.3*   ALB 3.2*   GLOB 3.1     No results for input(s): INR, PTP, APTT, INREXT, INREXT in the last 72 hours. No results for input(s): FE, TIBC, PSAT, FERR in the last 72 hours.    No results found for: FOL, RBCF   No results for input(s): PH, PCO2, PO2 in the last 72 hours. No results for input(s): CPK, CKNDX, TROIQ in the last 72 hours.     No lab exists for component: CPKMB  Lab Results   Component Value Date/Time    Cholesterol, total 117 04/18/2014 05:35 AM    HDL Cholesterol 49 04/18/2014 05:35 AM    LDL, calculated 50.6 04/18/2014 05:35 AM    Triglyceride 87 04/18/2014 05:35 AM    CHOL/HDL Ratio 2.4 04/18/2014 05:35 AM     Lab Results   Component Value Date/Time    Glucose (POC) 183 (H) 02/24/2020 04:58 PM    Glucose (POC) 183 (H) 02/24/2020 12:41 PM    Glucose (POC) 168 (H) 02/24/2020 06:12 AM    Glucose (POC) 134 (H) 02/23/2020 10:15 PM    Glucose (POC) 158 (H) 02/23/2020 04:57 PM     Lab Results   Component Value Date/Time    Color YELLOW 02/09/2012 06:30 PM    Appearance CLEAR 02/09/2012 06:30 PM    Specific gravity 1.015 02/09/2012 06:30 PM    pH (UA) 5.0 02/09/2012 06:30 PM    Protein NEGATIVE  02/09/2012 06:30 PM    Glucose NEGATIVE  02/09/2012 06:30 PM    Ketone NEGATIVE  02/09/2012 06:30 PM    Bilirubin NEGATIVE  02/09/2012 06:30 PM    Urobilinogen 0.2 02/09/2012 06:30 PM    Nitrites NEGATIVE  02/09/2012 06:30 PM    Leukocyte Esterase NEGATIVE  02/09/2012 06:30 PM    Epithelial cells 0-5 02/09/2012 06:30 PM    Bacteria NEGATIVE  02/09/2012 06:30 PM    WBC 0-4 02/09/2012 06:30 PM    RBC 0-3 02/09/2012 06:30 PM       Med list reviewed  Current Facility-Administered Medications   Medication Dose Route Frequency    sodium chloride (NS) flush 5-40 mL  5-40 mL IntraVENous Q8H    sodium chloride (NS) flush 5-40 mL  5-40 mL IntraVENous PRN    aspirin chewable tablet 81 mg  81 mg Oral DAILY    acetaminophen (TYLENOL) tablet 650 mg  650 mg Oral Q4H PRN    HYDROcodone-acetaminophen (NORCO) 5-325 mg per tablet 1 Tab  1 Tab Oral Q4H PRN    morphine injection 1 mg  1 mg IntraVENous Q4H PRN    insulin lispro (HUMALOG) injection   SubCUTAneous AC&HS    glucose chewable tablet 16 g  4 Tab Oral PRN    glucagon (GLUCAGEN) injection 1 mg  1 mg IntraMUSCular PRN    dextrose 10% infusion 0-250 mL  0-250 mL IntraVENous PRN    lidocaine 4 % patch 1 Patch  1 Patch TransDERmal Q24H    albuterol-ipratropium (DUO-NEB) 2.5 MG-0.5 MG/3 ML  3 mL Nebulization Q6H PRN    atorvastatin (LIPITOR) tablet 20 mg  20 mg Oral QHS    latanoprost (XALATAN) 0.005 % ophthalmic solution 1 Drop  1 Drop Both Eyes QHS    nitroglycerin (NITROSTAT) tablet 0.4 mg  0.4 mg SubLINGual Q5MIN PRN     D/w pt's  over the phone per the pt's request.    Tammy Figueroa MD  Internal Medicine  Date of Service: 2/24/2020

## 2020-02-25 NOTE — PROGRESS NOTES
Bedside and Verbal shift change report given to [facility] (oncoming nurse) by Nick Orta (offgoing nurse). Report included the following information SBAR, Kardex, Intake/Output, MAR and Recent Results.

## 2020-02-25 NOTE — PROGRESS NOTES
Problem: Self Care Deficits Care Plan (Adult)  Goal: *Acute Goals and Plan of Care (Insert Text)  Description    FUNCTIONAL STATUS PRIOR TO ADMISSION: mod I self care PTA, \"surgery for kidney cancer in ,\" [facility]    HOME SUPPORT:  had cardiac surgery 1 yr ago, uses lift chair and is always \"short of breath. He can't help me. \"  Diamante Reese assisted patient off floor after current fall down steps. Occupational Therapy Goals  Initiated 2/22/2020  1. Patient will perform grooming in standing VSS with modified independence within 7 day(s). 2.  Patient will perform upper body dressing and bathing with minimal assistance/contact guard assist within 7 day(s). 3.  Patient will perform lower body dressing and bathing with minimal assistance/contact guard assist within 7 day(s). 4.  Patient will perform toilet transfers with contact guard assist within 7 day(s). - Met 2/25/20, upgrade to Mod [place]  5. Patient will perform all aspects of toileting with less than 5/10 reported pain and with supervision/set-up within 7 day(s). 6.  Patient will participate in effective use of incentive spirometer and demonstrate upper extremity therapeutic exercise/activities with supervision/set-up for 5 minutes within 7 day(s). 7.  Patient will utilize energy conservation, fall prevention, pain management and PLB techniques during functional activities with verbal cues within 7 day(s). Outcome: Progressing Towards Goal  OCCUPATIONAL THERAPY TREATMENT  Patient: Charmaine Price (87 y.o. female)  Date: 2/25/2020  Diagnosis: Chest pain [R07.9]  Chest pain [R07.9]  Chest pain [R07.9]  Rib fracture [S22.39XA]  Chest pain [R07.9]   <principal problem not specified>       Precautions: Fall(L rib fx)  Chart, occupational therapy assessment, plan of care, and goals were reviewed. ASSESSMENT  Patient continues with skilled OT services and is progressing towards goals.   Pt noted with improved toileting independence this date, completing transfer on/off elevated commode with CGA; however, she continues to benefit from cues for decreasing respiration rate, as well as, Min verbal cues for safe DME technique to remain within confines of RW rather than attempting to place off to side. Pt able to tolerate RA 02 greater than 90% during standing grooming/toileting at RW. Pt continues to be limited by high reports of chest pain (8/10), decreased activity tolerance, decreased full body reaching and decreased mobility/balance and would benefit from continued skilled OT to address listed functional deficits to maximize highest level of safe functional independence prior to discharge from acute OT services. Current Level of Function Impacting Discharge (ADLs): Moderate assist for RW level toileting, Max A for LE dressing, Mod A for full body bathing    Other factors to consider for discharge: high pain during functional movement, poor activity tolerance         PLAN :  Patient continues to benefit from skilled intervention to address the above impairments. Continue treatment per established plan of care. to address goals. Recommend with staff: active ADL engagement to pt's tolerance, OOB meals, RW level toileting    Recommend next OT session: LE bathing/dressing with AE, activity tolerance, ADL related mobility/balance    Recommendation for discharge: (in order for the patient to meet his/her long term goals)  Therapy up to 5 days/week in SNF setting or an intensive home health therapy program    This discharge recommendation:  Has not yet been discussed the attending provider and/or case management    IF patient discharges home will need the following DME: TBD       SUBJECTIVE:   Patient stated the hard part isn't getting out of the bed, its getting into it.     OBJECTIVE DATA SUMMARY:   Cognitive/Behavioral Status:  Neurologic State: Alert  Orientation Level: Oriented X4  Cognition: Appropriate decision making; Appropriate for age attention/concentration; Appropriate safety awareness  Perception: Appears intact  Perseveration: No perseveration noted  Safety/Judgement: Awareness of environment; Fall prevention;Good awareness of safety precautions    Functional Mobility and Transfers for ADLs:  Bed Mobility:  Supine to Sit: Minimum assistance(head of bed elevated)  Sit to Supine: Moderate assistance(for BLE management)    Transfers:  Sit to Stand: Contact guard assistance  Functional Transfers  Bathroom Mobility: Contact guard assistance  Toilet Transfer : Contact guard assistance  Cues: Verbal cues provided(for safe RW technique)       Balance:  Sitting: Intact  Sitting - Static: Good (unsupported)  Sitting - Dynamic: Fair (occasional)  Standing: Impaired  Standing - Static: Constant support;Good  Standing - Dynamic : Constant support; Fair    ADL Intervention:  Grooming  Grooming Assistance: Stand-by assistance(RW at sink)  Position Performed: Standing  Brushing Teeth: Stand-by assistance(RW at sink)  Cues: Verbal cues provided(to decrease rate of respiration)      Toileting  Toileting Assistance: Moderate assistance  Bladder Hygiene: Contact guard assistance(standing at RW)  Clothing Management: Moderate assistance  Cues: Verbal cues provided(for safe RW technique)  Adaptive Equipment: Toilet tongs(education provided on AE to minimize pain reports)    Cognitive Retraining  Safety/Judgement: Awareness of environment; Fall prevention;Good awareness of safety precautions    Pain:  8/10; nursing aware and following    Activity Tolerance:   Fair and requires frequent rest breaks  Please refer to the flowsheet for vital signs taken during this treatment.     After treatment patient left in no apparent distress:   Supine in bed, Call bell within reach, Caregiver / family present, and Side rails x 3    COMMUNICATION/COLLABORATION:   The patients plan of care was discussed with: Physical Therapy Assistant    Darlene Mariee OT  Time Calculation: 28 mins

## 2020-02-25 NOTE — ROUTINE PROCESS
Bedside shift change report given to Lucien VillarrealRN (oncoming nurse) by Martin Dakins, RN(offgoing nurse). Report given with SBAR.

## 2020-02-25 NOTE — PROGRESS NOTES
Bedside and Verbal shift change report given to Tommie Kramer (oncoming nurse) by Rosalio Reeves (offgoing nurse). Report included the following information SBAR and Kardex.

## 2020-02-25 NOTE — PROGRESS NOTES
[facility] Adult  Hospitalist Group                                                                                          Hospitalist Progress Note  Sheyla George MD  Answering service: [phone] from in house phone        Date of Service:  2020  NAME:  Margarete Duane  :  1940  MRN:  [id number]      Admission Summary: This is a 75-year-old female who came from home with left-sided chest pain after a fall. She missed a step and she fell down 3 steps. She landed on the left side, denied hitting her head. No presyncope or syncope. She is tender on the left anterior chest, CT scan showed 6 rib fracture nondisplaced. Interval history / Subjective: Follow-up for ribs fracture, chest pain.  -Patient still complains of pain not being controlled. Assessment & Plan:     #Left anterior sixth rib fracture, nondisplaced. Pain exacerbated by deep breathing with reproducible tenderness  -Pain control: Scheduled Tylenol, oxycodone and IV morphine for severe pain  -Encourage patient to use incentive spirometer and increase activity    #Mild dehydration: Resolved with IV fluids Hold losartan and diuretics. #Diabetes type II with hyperglycemia    -Accu-Cheks before meals and at bedtime  -Inpatient blood glucose control acceptable  -Can resume oral hypoglycemics upon discharge      # RCC status post recent partial resection of the right kidney    #CAD status post stent in  and : Continue antiplatelets    #DJD with bilateral hip replacement, the left hip is bothering her. Pelvic x-rays negative    #COPD without bronchospasm: Continue bronchodilators    #Hypertension: Pressures rising. Resume [place].     Code status: Full code  DVT prophylaxis: Heparin    Care Plan discussed with: Patient/Family and Nurse  Anticipated Disposition: SNF/LTC  Anticipated Discharge: 24 hours to 48 hours     Hospital Problems  Never Reviewed          Codes Class Noted POA    Chest pain ICD-10-CM: R07.9  ICD-9-CM: 786.50  2/21/2020 Unknown        Rib fracture ICD-10-CM: S22.39XA  ICD-9-CM: 807.00  2/21/2020 Unknown                Review of Systems:   A comprehensive review of systems was negative except for that written in the HPI. Vital Signs:    Last 24hrs VS reviewed since prior progress note. Most recent are:  Visit Vitals  /68 (BP 1 Location: Right arm, BP Patient Position: At rest)   Pulse 80   Temp 98.1 °F (36.7 °C)   Resp 16   Ht 5' 5\" (1.651 m)   Wt 88.5 kg (195 lb)   SpO2 100%   BMI 32.45 kg/m²       No intake or output data in the 24 hours ending 02/25/20 1741     Physical Examination:             Constitutional:  No acute distress, cooperative, pleasant    ENT:  Oral mucosa moist, oropharynx benign. Resp:  Left chest wall tenderness from the sixth rib fracture. CTA bilaterally. No wheezing/rhonchi/rales. No accessory muscle use   CV:  Regular rhythm, normal rate, no murmurs, gallops, rubs    GI:  Soft, non distended, non tender. normoactive bowel sounds, no hepatosplenomegaly     Musculoskeletal:  No edema, warm, 2+ pulses throughout    Neurologic:  Moves all extremities. AAOx3, CN II-XII reviewed            Data Review:    Review and/or order of clinical lab test  Review and/or order of tests in the radiology section of CPT  Review and/or order of tests in the medicine section of CPT      Labs:     Recent Labs     02/25/20  0609 02/24/20 0221   WBC 8.0 8.1   HGB 13.5 11.9   HCT 41.9 37.4   * 118*     Recent Labs     02/25/20  0609 02/24/20  0221    138   K 4.3 4.1    107   CO2 28 28   BUN 22* 23*   CREA 0.90 0.87   * 136*   CA 9.2 8.7     No results for input(s): SGOT, GPT, ALT, AP, TBIL, TBILI, TP, ALB, GLOB, GGT, AML, LPSE in the last 72 hours. No lab exists for component: AMYP, HLPSE  No results for input(s): INR, PTP, APTT, INREXT in the last 72 hours. No results for input(s): FE, TIBC, PSAT, FERR in the last 72 hours.    No results found for: FOL, RBCF   No results for input(s): PH, PCO2, PO2 in the last 72 hours. No results for input(s): CPK, CKNDX, TROIQ in the last 72 hours.     No lab exists for component: CPKMB  Lab Results   Component Value Date/Time    Cholesterol, total 117 04/18/2014 05:35 AM    HDL Cholesterol 49 04/18/2014 05:35 AM    LDL, calculated 50.6 04/18/2014 05:35 AM    Triglyceride 87 04/18/2014 05:35 AM    CHOL/HDL Ratio 2.4 04/18/2014 05:35 AM     Lab Results   Component Value Date/Time    Glucose (POC) 149 (H) 02/25/2020 04:32 PM    Glucose (POC) 163 (H) 02/25/2020 11:55 AM    Glucose (POC) 163 (H) 02/25/2020 07:17 AM    Glucose (POC) 131 (H) 02/24/2020 09:41 PM    Glucose (POC) 183 (H) 02/24/2020 04:58 PM     Lab Results   Component Value Date/Time    Color YELLOW 02/09/2012 06:30 PM    Appearance CLEAR 02/09/2012 06:30 PM    Specific gravity 1.015 02/09/2012 06:30 PM    pH (UA) 5.0 02/09/2012 06:30 PM    Protein NEGATIVE  02/09/2012 06:30 PM    Glucose NEGATIVE  02/09/2012 06:30 PM    Ketone NEGATIVE  02/09/2012 06:30 PM    Bilirubin NEGATIVE  02/09/2012 06:30 PM    Urobilinogen 0.2 02/09/2012 06:30 PM    Nitrites NEGATIVE  02/09/2012 06:30 PM    Leukocyte Esterase NEGATIVE  02/09/2012 06:30 PM    Epithelial cells 0-5 02/09/2012 06:30 PM    Bacteria NEGATIVE  02/09/2012 06:30 PM    WBC 0-4 02/09/2012 06:30 PM    RBC 0-3 02/09/2012 06:30 PM         Medications Reviewed:     Current Facility-Administered Medications   Medication Dose Route Frequency    acetaminophen (TYLENOL) tablet 1,000 mg  1,000 mg Oral BID    [START ON 2/26/2020] lidocaine 4 % patch 2 Patch  2 Patch TransDERmal Q24H    oxyCODONE IR (ROXICODONE) tablet 5-10 mg  5-10 mg Oral Q6H PRN    sodium chloride (NS) flush 5-40 mL  5-40 mL IntraVENous Q8H    sodium chloride (NS) flush 5-40 mL  5-40 mL IntraVENous PRN    aspirin chewable tablet 81 mg  81 mg Oral DAILY    acetaminophen (TYLENOL) tablet 650 mg  650 mg Oral Q4H PRN    morphine injection 1 mg 1 mg IntraVENous Q4H PRN    insulin lispro (HUMALOG) injection   SubCUTAneous AC&HS    glucose chewable tablet 16 g  4 Tab Oral PRN    glucagon (GLUCAGEN) injection 1 mg  1 mg IntraMUSCular PRN    dextrose 10% infusion 0-250 mL  0-250 mL IntraVENous PRN    albuterol-ipratropium (DUO-NEB) 2.5 MG-0.5 MG/3 ML  3 mL Nebulization Q6H PRN    atorvastatin (LIPITOR) tablet 20 mg  20 mg Oral QHS    latanoprost (XALATAN) 0.005 % ophthalmic solution 1 Drop  1 Drop Both Eyes QHS    nitroglycerin (NITROSTAT) tablet 0.4 mg  0.4 mg SubLINGual Q5MIN PRN     ______________________________________________________________________  EXPECTED LENGTH OF STAY: 2d 12h  ACTUAL LENGTH OF STAY:          1                 Jenna Trujillo MD

## 2020-02-25 NOTE — PROGRESS NOTES
Bedside and Verbal shift change report given to Isaiah العلي (oncoming nurse) by Jeri Rivera (offgoing nurse). Report included the following information SBAR and Kardex.

## 2020-02-25 NOTE — PROGRESS NOTES
Problem: Falls - Risk of  Goal: *Absence of Falls  Description  Document Maria Luz Williamson Fall Risk and appropriate interventions in the flowsheet. Outcome: Progressing Towards Goal  Note:   Fall Risk Interventions:  Mobility Interventions: Communicate number of staff needed for ambulation/transfer, Patient to call before getting OOB, PT Consult for mobility concerns    Medication Interventions: Patient to call before getting OOB, Teach patient to arise slowly    Elimination Interventions: Call light in reach, Patient to call for help with toileting needs, Stay With Me (per policy)    History of Falls Interventions:  Investigate reason for fall

## 2020-02-26 ENCOUNTER — APPOINTMENT (OUTPATIENT)
Dept: GENERAL RADIOLOGY | Age: 80
DRG: 185 | End: 2020-02-26
Attending: HOSPITALIST
Payer: MEDICARE

## 2020-02-26 LAB
GLUCOSE BLD STRIP.AUTO-MCNC: 142 MG/DL (ref 65–100)
GLUCOSE BLD STRIP.AUTO-MCNC: 174 MG/DL (ref 65–100)
GLUCOSE BLD STRIP.AUTO-MCNC: 176 MG/DL (ref 65–100)
GLUCOSE BLD STRIP.AUTO-MCNC: 191 MG/DL (ref 65–100)
SERVICE CMNT-IMP: ABNORMAL

## 2020-02-26 PROCEDURE — 74011636637 HC RX REV CODE- 636/637: Performed by: HOSPITALIST

## 2020-02-26 PROCEDURE — 82962 GLUCOSE BLOOD TEST: CPT

## 2020-02-26 PROCEDURE — 65270000029 HC RM PRIVATE

## 2020-02-26 PROCEDURE — 97530 THERAPEUTIC ACTIVITIES: CPT

## 2020-02-26 PROCEDURE — 97116 GAIT TRAINING THERAPY: CPT

## 2020-02-26 PROCEDURE — 74011250637 HC RX REV CODE- 250/637: Performed by: HOSPITALIST

## 2020-02-26 RX ORDER — HYDROCHLOROTHIAZIDE 25 MG/1
25 TABLET ORAL DAILY
Status: DISCONTINUED | OUTPATIENT
Start: 2020-02-26 | End: 2020-02-27 | Stop reason: HOSPADM

## 2020-02-26 RX ORDER — DILTIAZEM HYDROCHLORIDE 180 MG/1
180 CAPSULE, COATED, EXTENDED RELEASE ORAL DAILY
Status: DISCONTINUED | OUTPATIENT
Start: 2020-02-26 | End: 2020-02-27 | Stop reason: HOSPADM

## 2020-02-26 RX ORDER — DILTIAZEM HYDROCHLORIDE 180 MG/1
180 CAPSULE, COATED, EXTENDED RELEASE ORAL DAILY
COMMUNITY

## 2020-02-26 RX ORDER — LOSARTAN POTASSIUM 50 MG/1
100 TABLET ORAL DAILY
Status: DISCONTINUED | OUTPATIENT
Start: 2020-02-26 | End: 2020-02-27 | Stop reason: HOSPADM

## 2020-02-26 RX ADMIN — LOSARTAN POTASSIUM 100 MG: 50 TABLET, FILM COATED ORAL at 15:43

## 2020-02-26 RX ADMIN — ACETAMINOPHEN 650 MG: 325 TABLET ORAL at 13:35

## 2020-02-26 RX ADMIN — ASPIRIN 81 MG 81 MG: 81 TABLET ORAL at 09:33

## 2020-02-26 RX ADMIN — LATANOPROST 1 DROP: 50 SOLUTION OPHTHALMIC at 21:54

## 2020-02-26 RX ADMIN — OXYCODONE 5 MG: 5 TABLET ORAL at 21:53

## 2020-02-26 RX ADMIN — HYDROCHLOROTHIAZIDE 25 MG: 25 TABLET ORAL at 15:43

## 2020-02-26 RX ADMIN — DILTIAZEM HYDROCHLORIDE 180 MG: 180 CAPSULE, COATED, EXTENDED RELEASE ORAL at 15:43

## 2020-02-26 RX ADMIN — OXYCODONE 5 MG: 5 TABLET ORAL at 09:33

## 2020-02-26 RX ADMIN — Medication 10 ML: at 07:18

## 2020-02-26 RX ADMIN — Medication 10 ML: at 22:00

## 2020-02-26 RX ADMIN — OXYCODONE 5 MG: 5 TABLET ORAL at 15:43

## 2020-02-26 RX ADMIN — INSULIN LISPRO 2 UNITS: 100 INJECTION, SOLUTION INTRAVENOUS; SUBCUTANEOUS at 12:05

## 2020-02-26 RX ADMIN — Medication 10 ML: at 13:37

## 2020-02-26 RX ADMIN — ATORVASTATIN CALCIUM 20 MG: 20 TABLET, FILM COATED ORAL at 21:53

## 2020-02-26 RX ADMIN — INSULIN LISPRO 2 UNITS: 100 INJECTION, SOLUTION INTRAVENOUS; SUBCUTANEOUS at 17:31

## 2020-02-26 RX ADMIN — INSULIN LISPRO 2 UNITS: 100 INJECTION, SOLUTION INTRAVENOUS; SUBCUTANEOUS at 07:17

## 2020-02-26 RX ADMIN — OXYCODONE 5 MG: 5 TABLET ORAL at 02:31

## 2020-02-26 RX ADMIN — ACETAMINOPHEN 1000 MG: 500 TABLET ORAL at 17:31

## 2020-02-26 NOTE — PROGRESS NOTES
Occupational Therapy    Chart reviewed, attempted to see pt for skilled OT; however, pt declined 2/2 just finishing therapy and recently getting back into bed. Will defer and follow up tomorrow, as appropriate. Thanks.     Trevor Wilcox, OT

## 2020-02-26 NOTE — PROGRESS NOTES
Bedside and Verbal shift change report given to Lindy (oncoming nurse) by Abe Singletary (offgoing nurse). Report included the following information SBAR.

## 2020-02-26 NOTE — PROGRESS NOTES
Problem: Mobility Impaired (Adult and Pediatric)  Goal: *Acute Goals and Plan of Care (Insert Text)  Description  FUNCTIONAL STATUS PRIOR TO ADMISSION: Patient was modified independent using a single point cane for functional mobility. HOME SUPPORT PRIOR TO ADMISSION: The patient lived with spouse but did not require assist.    Physical Therapy Goals  Initiated 2/21/2020  1. Patient will move from supine to sit and sit to supine , scoot up and down and roll side to side in bed with modified independence within 7 day(s). 2.  Patient will transfer from bed to chair and chair to bed with modified independence using the least restrictive device within 7 day(s). 3.  Patient will perform sit to stand with modified independence within 7 day(s). 4.  Patient will ambulate with modified independence for 150 feet with the least restrictive device within 7 day(s). 5.  Patient will ascend/descend 12 stairs with handrail(s) with modified independence within 7 day(s). Outcome: Progressing Towards Goal   PHYSICAL THERAPY TREATMENT  Patient: Margarete Duane (71 y.o. female)  Date: 2/26/2020  Diagnosis: Chest pain [R07.9]  Chest pain [R07.9]  Chest pain [R07.9]  Rib fracture [S22.39XA]  Chest pain [R07.9]   <principal problem not specified>       Precautions: Fall(L rib fx)  Chart, physical therapy assessment, plan of care and goals were reviewed. ASSESSMENT  Patient continues with skilled PT services and is progressing towards goals. Patient is making good gains in her mobility but still has a lot of pain on the R more than the L. The pain is exacerbated by bending forward and getting into bed and she is very tender to touch on the anterior right rib area moreso than the L. Applied ice for pain relief after mobility training. Although her SpO2 remains in the mid 90s on room air at rest, she drops to 88% with activity on room air.   She is still not able to take a deep inhalation due to the right side pain.  Continue to recommend rehab prior to return home to improve her mobility and functional independence to enable a safe transition home. Current Level of Function Impacting Discharge (mobility/balance): mod assist bed mobility, CGA for short distance ambulation with RW    Other factors to consider for discharge: O2 needs         PLAN :  Patient continues to benefit from skilled intervention to address the above impairments. Continue treatment per established plan of care. to address goals. Recommendation for discharge: (in order for the patient to meet his/her long term goals)  Therapy up to 5 days/week in SNF setting    This discharge recommendation:  Has been made in collaboration with the attending provider and/or case management    IF patient discharges home will need the following DME: she will likely need a RW for discharge home       SUBJECTIVE:   Patient stated I still can't do much, it's too painful.     OBJECTIVE DATA SUMMARY:   Critical Behavior:  Neurologic State: Alert, Appropriate for age  Orientation Level: Appropriate for age, Oriented X4  Cognition: Appropriate decision making, Appropriate for age attention/concentration, Appropriate safety awareness, Follows commands  Safety/Judgement: Awareness of environment, Fall prevention, Good awareness of safety precautions  Functional Mobility Training:  Bed Mobility:        Sit to Supine: Moderate assistance; Additional time(for LEs)           Transfers:  Sit to Stand: Contact guard assistance; Adaptive equipment; Additional time(needs verbal cues to push up from the bed not pull on the RW)  Stand to Sit: Stand-by assistance; Additional time; Adaptive equipment        Bed to Chair: Contact guard assistance; Adaptive equipment; Additional time                    Balance:  Sitting: Intact; Without support  Standing: Intact; With support  Ambulation/Gait Training:  Distance (ft): 80 Feet (ft)(plus 30)  Assistive Device: Gait belt;Walker, rolling  Ambulation - Level of Assistance: Contact guard assistance; Adaptive equipment; Additional time        Gait Abnormalities: Decreased step clearance;Shuffling gait        Base of Support: Widened     Speed/Helen: Pace decreased (<100 feet/min)  Step Length: Left shortened;Right shortened        Interventions: Safety awareness training; Tactile cues; Verbal cues; Visual/Demos           Stairs: Therapeutic Exercises:     Pain Rating:  Severe pain with bed mobility, but relieved at rest with ice in place. RN gave pain meds    Activity Tolerance:   Fair and requires rest breaks  Please refer to the flowsheet for vital signs taken during this treatment.     After treatment patient left in no apparent distress:   Supine in bed, Call bell within reach, and ice in place R anterior chest     COMMUNICATION/COLLABORATION:   The patients plan of care was discussed with: Registered Nurse    Neha Reed, PT   Time Calculation: 40 mins

## 2020-02-26 NOTE — PROGRESS NOTES
Bedside and Verbal shift change report given to Erika Chiu RN (oncoming nurse) by Geroge Persaud RN (offgoing nurse). Report included the following information SBAR, Kardex, Procedure Summary, Intake/Output, MAR and Recent Results.

## 2020-02-27 ENCOUNTER — APPOINTMENT (OUTPATIENT)
Dept: GENERAL RADIOLOGY | Age: 80
DRG: 185 | End: 2020-02-27
Attending: HOSPITALIST
Payer: MEDICARE

## 2020-02-27 VITALS
RESPIRATION RATE: 16 BRPM | SYSTOLIC BLOOD PRESSURE: 145 MMHG | HEART RATE: 78 BPM | HEIGHT: 65 IN | BODY MASS INDEX: 32.49 KG/M2 | TEMPERATURE: 97.6 F | DIASTOLIC BLOOD PRESSURE: 85 MMHG | OXYGEN SATURATION: 93 % | WEIGHT: 195 LBS

## 2020-02-27 LAB
GLUCOSE BLD STRIP.AUTO-MCNC: 145 MG/DL (ref 65–100)
GLUCOSE BLD STRIP.AUTO-MCNC: 215 MG/DL (ref 65–100)
SERVICE CMNT-IMP: ABNORMAL
SERVICE CMNT-IMP: ABNORMAL

## 2020-02-27 PROCEDURE — 74011250637 HC RX REV CODE- 250/637: Performed by: HOSPITALIST

## 2020-02-27 PROCEDURE — 71110 X-RAY EXAM RIBS BIL 3 VIEWS: CPT

## 2020-02-27 PROCEDURE — 82962 GLUCOSE BLOOD TEST: CPT

## 2020-02-27 PROCEDURE — 74011250637 HC RX REV CODE- 250/637: Performed by: FAMILY MEDICINE

## 2020-02-27 PROCEDURE — 74011636637 HC RX REV CODE- 636/637: Performed by: HOSPITALIST

## 2020-02-27 PROCEDURE — 97116 GAIT TRAINING THERAPY: CPT

## 2020-02-27 RX ORDER — OXYCODONE HYDROCHLORIDE 5 MG/1
5 TABLET ORAL
Qty: 30 TAB | Refills: 0 | Status: SHIPPED | OUTPATIENT
Start: 2020-02-27 | End: 2020-03-01

## 2020-02-27 RX ORDER — AMOXICILLIN 250 MG
2 CAPSULE ORAL 2 TIMES DAILY
Status: DISCONTINUED | OUTPATIENT
Start: 2020-02-27 | End: 2020-02-27 | Stop reason: HOSPADM

## 2020-02-27 RX ORDER — ATORVASTATIN CALCIUM 20 MG/1
20 TABLET, FILM COATED ORAL
Qty: 30 TAB | Refills: 4 | Status: SHIPPED | OUTPATIENT
Start: 2020-02-27

## 2020-02-27 RX ORDER — AMOXICILLIN 250 MG
2 CAPSULE ORAL 2 TIMES DAILY
Qty: 60 TAB | Refills: 0 | Status: SHIPPED | OUTPATIENT
Start: 2020-02-27

## 2020-02-27 RX ORDER — ACETAMINOPHEN 500 MG
1000 TABLET ORAL 2 TIMES DAILY
Qty: 116 TAB | Refills: 0 | Status: SHIPPED
Start: 2020-02-27 | End: 2020-03-27

## 2020-02-27 RX ORDER — HYDROCHLOROTHIAZIDE 25 MG/1
25 TABLET ORAL DAILY
Qty: 30 TAB | Refills: 2 | Status: SHIPPED | OUTPATIENT
Start: 2020-02-28

## 2020-02-27 RX ORDER — LIDOCAINE 4 G/100G
1 PATCH TOPICAL EVERY 24 HOURS
Qty: 30 PATCH | Refills: 1 | Status: SHIPPED | OUTPATIENT
Start: 2020-02-27

## 2020-02-27 RX ORDER — IPRATROPIUM BROMIDE AND ALBUTEROL SULFATE 2.5; .5 MG/3ML; MG/3ML
3 SOLUTION RESPIRATORY (INHALATION)
Qty: 30 NEBULE | Refills: 0 | Status: SHIPPED | OUTPATIENT
Start: 2020-02-27

## 2020-02-27 RX ORDER — ACETAMINOPHEN 325 MG/1
650 TABLET ORAL
Qty: 30 TAB | Refills: 0 | Status: SHIPPED
Start: 2020-02-27

## 2020-02-27 RX ADMIN — Medication 10 ML: at 06:28

## 2020-02-27 RX ADMIN — LOSARTAN POTASSIUM 100 MG: 50 TABLET, FILM COATED ORAL at 08:39

## 2020-02-27 RX ADMIN — ASPIRIN 81 MG 81 MG: 81 TABLET ORAL at 08:39

## 2020-02-27 RX ADMIN — HYDROCHLOROTHIAZIDE 25 MG: 25 TABLET ORAL at 08:39

## 2020-02-27 RX ADMIN — INSULIN LISPRO 3 UNITS: 100 INJECTION, SOLUTION INTRAVENOUS; SUBCUTANEOUS at 12:25

## 2020-02-27 RX ADMIN — INSULIN LISPRO 2 UNITS: 100 INJECTION, SOLUTION INTRAVENOUS; SUBCUTANEOUS at 06:36

## 2020-02-27 RX ADMIN — OXYCODONE 5 MG: 5 TABLET ORAL at 06:27

## 2020-02-27 RX ADMIN — ACETAMINOPHEN 1000 MG: 500 TABLET ORAL at 08:39

## 2020-02-27 RX ADMIN — LACTULOSE 45 ML: 20 SOLUTION ORAL at 09:38

## 2020-02-27 RX ADMIN — OXYCODONE 5 MG: 5 TABLET ORAL at 13:21

## 2020-02-27 RX ADMIN — DILTIAZEM HYDROCHLORIDE 180 MG: 180 CAPSULE, COATED, EXTENDED RELEASE ORAL at 08:39

## 2020-02-27 RX ADMIN — SENNOSIDES AND DOCUSATE SODIUM 2 TABLET: 8.6; 5 TABLET ORAL at 09:38

## 2020-02-27 NOTE — PROGRESS NOTES
Problem: Mobility Impaired (Adult and Pediatric)  Goal: *Acute Goals and Plan of Care (Insert Text)  Description  FUNCTIONAL STATUS PRIOR TO ADMISSION: Patient was modified independent using a single point cane for functional mobility. HOME SUPPORT PRIOR TO ADMISSION: The patient lived with spouse but did not require assist.    Physical Therapy Goals  Initiated 2/21/2020  1. Patient will move from supine to sit and sit to supine , scoot up and down and roll side to side in bed with modified independence within 7 day(s). 2.  Patient will transfer from bed to chair and chair to bed with modified independence using the least restrictive device within 7 day(s). 3.  Patient will perform sit to stand with modified independence within 7 day(s). 4.  Patient will ambulate with modified independence for 150 feet with the least restrictive device within 7 day(s). 5.  Patient will ascend/descend 12 stairs with handrail(s) with modified independence within 7 day(s). Outcome: Progressing Towards Goal   PHYSICAL THERAPY TREATMENT  Patient: Charmaine Price (1 y.o. female)  Date: 2/27/2020  Diagnosis: Chest pain [R07.9]  Chest pain [R07.9]  Chest pain [R07.9]  Rib fracture [S22.39XA]  Chest pain [R07.9]   <principal problem not specified>       Precautions: Fall(L rib fx)  Chart, physical therapy assessment, plan of care and goals were reviewed. ASSESSMENT  Patient continues with skilled PT services and is progressing towards goals. She was better able to manage her bed mobility today and was able to walk a short distance without supplemental O2 with no complaints of ANAYA. SpO2 was 93% after walking on room air, and then 95% after a brief seated rest on room air. Encouraged continued activity with assistance to increase her endurance and decrease her reliance on the walker for balance and pain control. Plan is for D/C to SNF today.      Current Level of Function Impacting Discharge (mobility/balance): min to mod assist with bed mobility, CGA for short distance ambulation with RW    Other factors to consider for discharge: was independent prior to admission         PLAN :  Patient continues to benefit from skilled intervention to address the above impairments. Continue treatment per established plan of care. to address goals. Recommendation for discharge: (in order for the patient to meet his/her long term goals)  Therapy up to 5 days/week in SNF setting    This discharge recommendation:  Has been made in collaboration with the attending provider and/or case management    IF patient discharges home will need the following DME: to be determined (TBD)       SUBJECTIVE:   Patient stated I am leaving in an hour, I don't have to get up.     OBJECTIVE DATA SUMMARY:   Critical Behavior:  Neurologic State: Alert, Appropriate for age  Orientation Level: Appropriate for age, Oriented X4  Cognition: Appropriate decision making, Appropriate for age attention/concentration, Appropriate safety awareness, Follows commands  Safety/Judgement: Awareness of environment, Fall prevention, Good awareness of safety precautions  Functional Mobility Training:  Bed Mobility:     Supine to Sit: Moderate assistance; Additional time(going to the R side, noted less pain today)              Transfers:  Sit to Stand: Stand-by assistance; Adaptive equipment; Additional time  Stand to Sit: Stand-by assistance; Adaptive equipment; Additional time        Bed to Chair: Contact guard assistance; Adaptive equipment; Additional time                    Balance:  Sitting: Impaired; Without support  Standing: Intact; With support  Ambulation/Gait Training:  Distance (ft): 50 Feet (ft)  Assistive Device: Gait belt;Walker, rolling  Ambulation - Level of Assistance: Contact guard assistance; Additional time; Adaptive equipment        Gait Abnormalities: Decreased step clearance        Base of Support: Widened     Speed/Helen: Pace decreased (<100 feet/min)  Step Length: Left shortened;Right shortened                    Stairs: Therapeutic Exercises:     Pain Rating:  Patient rates pain as \"terrible\" but no reports of pain with mobility as she had yesterday    Activity Tolerance:   Fair, SpO2 stable on RA, and requires rest breaks  Please refer to the flowsheet for vital signs taken during this treatment.     After treatment patient left in no apparent distress:   Sitting in chair, Call bell within reach, and Caregiver / family present    COMMUNICATION/COLLABORATION:   The patients plan of care was discussed with: Registered Nurse and     Cecily Wagoner, PT   Time Calculation: 13 mins

## 2020-02-27 NOTE — PROGRESS NOTES
Bedside and Verbal shift change report given to [address] (oncoming nurse) by Nafisa Hankins RN (offgoing nurse). Report included the following information SBAR, Kardex and MAR.

## 2020-02-27 NOTE — PROGRESS NOTES
Transition of Care Plan to SNF/Rehab    SNF/Rehab Transition:  Patient has been accepted to [facility] and meets criteria for admission. Patient will transported by [facility] and expected to leave at 1:30    [facility] phone [phone]  . Communication to Patient/Family:  Met with patient and they are agreeable to the transition plan. IM letter recieved    Communication to SNF/Rehab:  Bedside RN, Jun Parisi, has been notified to update the transition plan to the facility and call report (phone number [phone]). Discharge information has been updated on the AVS.     Discharge instructions to be fax'd to facility via [employer]. Nursing Please include all hard scripts for controlled substances, med rec and dc summary, and AVS in packet. Reviewed and confirmed with facility, [facility]/Admissions, can manage the patient care needs for the following:     Watson Dubon with (X) only those applicable:    Medication:  [x]  Medications will be available at the facility  []  IV Antibiotics   [x]  Controlled Substance - hard copy to be sent with patient   []  Weekly Labs   Documents:  [x] Hard RX  [x] MAR  [x] Kardex  [x] AVS  [x]Transfer Summary  [x]Discharge   Equipment:  []  CPAP/BiPAP  []  Wound Vacuum  []  Mason or Urinary Device  []  PICC/Central Line  []  Nebulizer  []  Ventilator   Treatment:  []Isolation (for MRSA, VRE, etc.)  []Surgical Drain Management  []Tracheostomy Care  []Dressing Changes  []Dialysis with transportation and chair time . []PEG Care  []Oxygen  []Daily Weights for Heart Failure   Dietary:  []Any diet limitations  []Tube Feedings   []Total Parenteral Management (TPN)   Eligible for Medicaid Long Term Services and Supports  Yes:  [] Eligible for medical assistance or will become eligible within 180 days and UAI completed. [] Provider/Patient and/or support system has requested screening.   [] UAI copy provided to patient or responsible party,   [] UAI unavailable at discharge will send once processed to SNF provider. [] UAI unavailable at discharged mailed to patient  No:   [] Private pay and is not financially eligible for Medicaid within the next 180 days. [] Reside out-of-state.   [] A residents of a state owned/operated facility that is licensed  by [facility] and [facility] or [facility]  [] Enrollment in [facility] hospice services  [] [facility]  [x] Patient /Family declines to have screening completed or provide financial information for screening     Financial Resources:  Medicaid    [] Initiated and application pending   [] Full coverage     Advanced Care Plan:  []Surrogate Decision Maker of Care  []POA  [x]Communicated Code Status (\"Full\")    Other IM letter recieved

## 2020-02-27 NOTE — DISCHARGE INSTRUCTIONS
Cardiac diet,   Activity as tolerated with OT/PT  Monitor oxygen levels and use oxygen as needed for SO2 levels < 90%  Continue using the incentive spirometer as instructed  Your platelet levels are slightly lower than normal- they were 129 on discharge  Consider having your platelet levels rechecked by your primary care or see hematologist as an outpatient  For lung nodules and enlarged lymph node in left armpit- monitor clinically and have your primary care MD repeat Chest CT in 6 months

## 2020-02-27 NOTE — PROGRESS NOTES
[address] Adult  Hospitalist Group                                                                                          Hospitalist Progress Note  Hiwot Chapa MD  Answering service: [phone] from in house phone        Date of Service:  2020  NAME:  Basil Adan  :  1940  MRN:  [id number]      Admission Summary: This is a 70-year-old female who came from home with left-sided chest pain after a fall. She missed a step and she fell down 3 steps. She landed on the left side, denied hitting her head. No presyncope or syncope. She is tender on the left anterior chest, CT scan showed 6 rib fracture nondisplaced. Interval history / Subjective: Follow-up for ribs fracture, chest pain.  -Complained of right rib cage pain today. Rib series x-ray requested. Assessment & Plan:     #Left anterior sixth rib fracture, nondisplaced. Pain exacerbated by deep breathing with reproducible tenderness  -Pain control: Scheduled Tylenol, oxycodone and IV morphine for severe pain  -Encourage patient to use incentive spirometer and increase activity  -Repeat stress x-ray as she is complaining of right-sided rib cage pain    #Mild dehydration: Resolved with IV fluids Hold losartan and diuretics. #Diabetes type II with hyperglycemia    -Accu-Cheks before meals and at bedtime  -Inpatient blood glucose control acceptable  -Can resume oral hypoglycemics upon discharge      # RCC status post recent partial resection of the right kidney    #CAD status post stent in  and : Continue antiplatelets    #DJD with bilateral hip replacement, the left hip is bothering her. Pelvic x-rays negative    #COPD without bronchospasm: Continue bronchodilators    #Hypertension: Pressures rising. Resume Diovan.     Code status: Full code  DVT prophylaxis: Heparin    Care Plan discussed with: Patient/Family and Nurse  Anticipated Disposition: SNF/LTC  Anticipated Discharge: 24 hours to 48 hours Hospital Problems  Never Reviewed          Codes Class Noted POA    Chest pain ICD-10-CM: R07.9  ICD-9-CM: 786.50  2/21/2020 Unknown        Rib fracture ICD-10-CM: S22.39XA  ICD-9-CM: 807.00  2/21/2020 Unknown                Review of Systems:   A comprehensive review of systems was negative except for that written in the HPI. Vital Signs:    Last 24hrs VS reviewed since prior progress note. Most recent are:  Visit Vitals  /55 (BP 1 Location: Right arm, BP Patient Position: At rest)   Pulse 77   Temp 98.4 °F (36.9 °C)   Resp 18   Ht 5' 5\" (1.651 m)   Wt 88.5 kg (195 lb)   SpO2 98%   BMI 32.45 kg/m²         Intake/Output Summary (Last 24 hours) at 2/26/2020 1924  Last data filed at 2/26/2020 0236  Gross per 24 hour   Intake    Output 1 ml   Net -1 ml        Physical Examination:             Constitutional:  No acute distress, cooperative, pleasant    ENT:  Oral mucosa moist, oropharynx benign. Resp:  Right lateral chest tender. CTA bilaterally. No wheezing/rhonchi/rales. No accessory muscle use   CV:  Regular rhythm, normal rate, no murmurs, gallops, rubs    GI:  Soft, non distended, non tender. normoactive bowel sounds, no hepatosplenomegaly     Musculoskeletal:  No edema, warm, 2+ pulses throughout    Neurologic:  Moves all extremities. AAOx3, CN II-XII reviewed            Data Review:    Review and/or order of clinical lab test  Review and/or order of tests in the radiology section of CPT  Review and/or order of tests in the medicine section of CPT      Labs:     Recent Labs     02/25/20  0609 02/24/20 0221   WBC 8.0 8.1   HGB 13.5 11.9   HCT 41.9 37.4   * 118*     Recent Labs     02/25/20  0609 02/24/20 0221    138   K 4.3 4.1    107   CO2 28 28   BUN 22* 23*   CREA 0.90 0.87   * 136*   CA 9.2 8.7     No results for input(s): SGOT, GPT, ALT, AP, TBIL, TBILI, TP, ALB, GLOB, GGT, AML, LPSE in the last 72 hours.     No lab exists for component: AMYP, HLPSE  No results for input(s): INR, PTP, APTT, INREXT, INREXT in the last 72 hours. No results for input(s): FE, TIBC, PSAT, FERR in the last 72 hours. No results found for: FOL, RBCF   No results for input(s): PH, PCO2, PO2 in the last 72 hours. No results for input(s): CPK, CKNDX, TROIQ in the last 72 hours.     No lab exists for component: CPKMB  Lab Results   Component Value Date/Time    Cholesterol, total 117 04/18/2014 05:35 AM    HDL Cholesterol 49 04/18/2014 05:35 AM    LDL, calculated 50.6 04/18/2014 05:35 AM    Triglyceride 87 04/18/2014 05:35 AM    CHOL/HDL Ratio 2.4 04/18/2014 05:35 AM     Lab Results   Component Value Date/Time    Glucose (POC) 174 (H) 02/26/2020 05:03 PM    Glucose (POC) 176 (H) 02/26/2020 11:09 AM    Glucose (POC) 142 (H) 02/26/2020 06:17 AM    Glucose (POC) 189 (H) 02/25/2020 09:09 PM    Glucose (POC) 149 (H) 02/25/2020 04:32 PM     Lab Results   Component Value Date/Time    Color YELLOW 02/09/2012 06:30 PM    Appearance CLEAR 02/09/2012 06:30 PM    Specific gravity 1.015 02/09/2012 06:30 PM    pH (UA) 5.0 02/09/2012 06:30 PM    Protein NEGATIVE  02/09/2012 06:30 PM    Glucose NEGATIVE  02/09/2012 06:30 PM    Ketone NEGATIVE  02/09/2012 06:30 PM    Bilirubin NEGATIVE  02/09/2012 06:30 PM    Urobilinogen 0.2 02/09/2012 06:30 PM    Nitrites NEGATIVE  02/09/2012 06:30 PM    Leukocyte Esterase NEGATIVE  02/09/2012 06:30 PM    Epithelial cells 0-5 02/09/2012 06:30 PM    Bacteria NEGATIVE  02/09/2012 06:30 PM    WBC 0-4 02/09/2012 06:30 PM    RBC 0-3 02/09/2012 06:30 PM         Medications Reviewed:     Current Facility-Administered Medications   Medication Dose Route Frequency    losartan (COZAAR) tablet 100 mg  100 mg Oral DAILY    hydroCHLOROthiazide (HYDRODIURIL) tablet 25 mg  25 mg Oral DAILY    dilTIAZem CD (CARDIZEM CD) capsule 180 mg  180 mg Oral DAILY    acetaminophen (TYLENOL) tablet 1,000 mg  1,000 mg Oral BID    lidocaine 4 % patch 2 Patch  2 Patch TransDERmal Q24H    oxyCODONE IR (ROXICODONE) tablet 5-10 mg  5-10 mg Oral Q6H PRN    sodium chloride (NS) flush 5-40 mL  5-40 mL IntraVENous Q8H    sodium chloride (NS) flush 5-40 mL  5-40 mL IntraVENous PRN    aspirin chewable tablet 81 mg  81 mg Oral DAILY    acetaminophen (TYLENOL) tablet 650 mg  650 mg Oral Q4H PRN    morphine injection 1 mg  1 mg IntraVENous Q4H PRN    insulin lispro (HUMALOG) injection   SubCUTAneous AC&HS    glucose chewable tablet 16 g  4 Tab Oral PRN    glucagon (GLUCAGEN) injection 1 mg  1 mg IntraMUSCular PRN    dextrose 10% infusion 0-250 mL  0-250 mL IntraVENous PRN    albuterol-ipratropium (DUO-NEB) 2.5 MG-0.5 MG/3 ML  3 mL Nebulization Q6H PRN    atorvastatin (LIPITOR) tablet 20 mg  20 mg Oral QHS    latanoprost (XALATAN) 0.005 % ophthalmic solution 1 Drop  1 Drop Both Eyes QHS    nitroglycerin (NITROSTAT) tablet 0.4 mg  0.4 mg SubLINGual Q5MIN PRN     ______________________________________________________________________  EXPECTED LENGTH OF STAY: 2d 12h  ACTUAL LENGTH OF STAY:          2                 Duane Pott, MD

## 2020-02-27 NOTE — PROGRESS NOTES
Hospital to [facility] [address]                                                                        78 y.o.   female    [address]   Room: [address] [place] [address] Gwen Willis  Unit Phone# :  [phone]      [address]  [address] [address]  Dept: [phone]  Loc: [phone]                    SITUATION     Admitted:  2/21/2020         Attending Provider:  Vania Cameron MD       Consultations:  None    PCP:  Jing Cartwright MD   [phone]    Treatment Team: Attending Provider: Vania Cameron MD; Utilization Review: Herbert Boo RN; Care Manager: Radha Espitia BSW    Admitting Dx:  Chest pain [R07.9]  Chest pain [R07.9]  Chest pain [R07.9]  Rib fracture [S22.39XA]  Chest pain [R07.9]       Principal Problem: <principal problem not specified>    * No surgery found * of      BY: * Surgery not found *             ON: * No surgery found *                  Code Status: Full Code                Advance Directives:   Advance Care Planning 2/27/2020   Confirm Advance Directive None    (Send w/patient)   Yes Not W Pt       Isolation:  There are currently no Active Isolations       MDRO: No current active infections    Pain Medications given:  Oxycodone    Last dose: 2/27/2020 at  1321    Special Equipment needed: no  Type of equipment:         BACKGROUND     Allergies:   Allergies   Allergen Reactions    Atenolol Hives    Codeine Nausea and Vomiting    Macrobid [Nitrofurantoin Monohyd/M-Cryst] Rash       Past Medical History:   Diagnosis Date    CAD (coronary artery disease)     COPD     Diabetes ([facility]     Gastrointestinal disorder     GERD (gastroesophageal reflux disease)     Hypertension     [facility]     Dr. Royal العراقي Osteoporosis        Past Surgical History:   Procedure Laterality Date    CARDIAC SURG PROCEDURE UNLIST      stents x 3    HX HEENT      tonsillectomy    HX NEPHRECTOMY Right partial [id number] for ca    HX ORTHOPAEDIC      bilateral hips replaced    HX ORTHOPAEDIC      L wrist titanium gentry placed    HX ORTHOPAEDIC      carpal tunnel bilaterally    HX ORTHOPAEDIC      trigger finger release on bilateral thumbs and ring and middle finger on L hand       Medications Prior to Admission   Medication Sig    dilTIAZem CD (CARDIZEM CD) 180 mg ER capsule Take 180 mg by mouth daily.  glipiZIDE (GLUCOTROL) 5 mg tablet Take 5 mg by mouth daily.  glucosamine-chondroitin (ARTHX) 500-400 mg cap Take 1-2 Caps by mouth daily.  losartan (COZAAR) 100 mg tablet Take 100 mg by mouth daily.  SITagliptin (JANUVIA) 50 mg tablet Take 50 mg by mouth daily.  cholecalciferol (VITAMIN D3) (1000 Units /25 mcg) tablet Take 1,000 Units by mouth daily.  vit C,W-Oy-fasfq-lutein-zeaxan (PRESERVISION AREDS-2) 801-128-13-1 mg-unit-mg-mg cap capsule Take 1 Cap by mouth two (2) times daily (with meals).  aspirin delayed-release 81 mg tablet Take 1 Tab by mouth daily.  tiotropium (SPIRIVA WITH HANDIHALER) 18 mcg inhalation capsule Take 1 Cap by inhalation daily.  therapeutic multivitamin (THERAGRAN) tablet Take 1 Tab by mouth daily.  atorvastatin (LIPITOR) 10 mg tablet Take 10 mg by mouth nightly.  latanoprost (XALATAN) 0.005 % ophthalmic solution Administer 1 Drop to both eyes nightly.  nitroglycerin (NITROSTAT) 0.4 mg SL tablet 1 Tab by SubLINGual route every five (5) minutes as needed for Chest Pain. Hard scripts included in transfer packet yes    Vaccinations:    Immunization History   Administered Date(s) Administered    Influenza Vaccine 09/01/2013    Pneumococcal Vaccine (Unspecified Type) 01/01/2011       Known Risks: High fall Risk, risk for injury        The Charlson CoMorbitiy Index tool is an evidenced based tool that has more automatic generated information.  The tool looks at many different items such as the age of the patient, how many times they were admitted in the last calendar year, current length of stay in the hospital and their diagnosis. All of these items are pulled automatically from information documented in the chart from various places and will generate a score that predicts whether a patient is at low (less than 13), medium (13-20) or high (21 or greater) risk of being readmitted.         ASSESSMENT                Temp: 97.6 °F (36.4 °C) (02/27/20 0834) Pulse (Heart Rate): 78 (02/27/20 0834)     Resp Rate: 16 (02/27/20 0834)           BP: 145/85 (02/27/20 0834)     O2 Sat (%): 93 % (02/27/20 1324)     Weight: 88.5 kg (195 lb)    Height: 5' 5\" (165.1 cm) (02/21/20 0244)       If above not within 1 hour of discharge:    BP:_____  P:____  R:____ T:_____ O2 Sat: ___%  O2: ______    Active Orders   Diet    DIET CARDIAC Regular         Orientation: oriented to time, place, person and situation     Active Behaviors: None                                   Active Lines/Drains:  (Peg Tube / Mason / CL or S/L?): no    Urinary Status: Voiding     Last BM: Last Bowel Movement Date: 02/20/20     Skin Integrity: Abrasion(RLE back of calf)             Mobility: Slightly limited   Weight Bearing Status: WBAT (Weight Bearing as Tolerated)      Gait Training  Assistive Device: Gait belt, Walker, rolling  Ambulation - Level of Assistance: Contact guard assistance, Additional time, Adaptive equipment  Distance (ft): 50 Feet (ft)  Interventions: Safety awareness training, Tactile cues, Verbal cues, Visual/Demos         Lab Results   Component Value Date/Time    Glucose 148 (H) 02/25/2020 06:09 AM    INR 1.0 04/17/2014 07:00 AM    INR 1.8 (H) 04/29/2010 06:15 AM    HGB 13.5 02/25/2020 06:09 AM    HGB 11.9 02/24/2020 02:21 AM        RECOMMENDATION     See After Visit Summary (AVS) for:  · Discharge instructions  · [facility]   · Special equipment needed (entered pre-discharge by Care Management)  · Medication Reconciliation    · Follow up Appointment(s)         Report given/sent by:  Axel Song RN                    Verbal report given to: Janey Smith  FAXED to:  N/A         Estimated discharge time:  2/27/2020 at 3308

## 2020-02-27 NOTE — PROGRESS NOTES
RUR-11%    NANCY: Discharge today to [facility] via [facility] 1:30. Discharge order and AVS pending at this time.  CRM will follow     Shauna Mcrae, [address] [place] [address]   [phone]

## 2020-02-27 NOTE — DISCHARGE SUMMARY
Discharge Summary       PATIENT ID: Kel Maldonado  MRN: [id number]   YOB: 1940    DATE OF ADMISSION: 2/21/2020  2:35 AM    DATE OF DISCHARGE: 2/27/2020 1pm  PRIMARY CARE PROVIDER: Shaan Farley MD     ATTENDING [facility]  DISCHARGING PROVIDER: Debra Chaudhary MD    To contact this individual call [phone] and ask the  to page. If unavailable ask to be transferred the Adult Hospitalist Department. CONSULTATIONS: None    PROCEDURES/SURGERIES: * No surgery found *    ADMITTING DIAGNOSES & HOSPITAL COURSE:   This is a 70-year-old female with a past medical history of coronary artery disease and had stents put in first in 1995 and recently 2014, hypertension, COPD, type 2 diabetes, GERD, renal cell carcinoma status post nephrectomy December 2019 presented to the [place] emergency room for a left-sided chest pain. Patient fell 3 steps when she missed a step. Experiencing pain since then worse with deep breathing. Her work-up thus far showed negative cardiac enzymes, CAT scan of the chest showed a nondisplaced left sixth anterior rib fracture. In the ED she received 3 doses of IV Dilaudid. In the ED, there was some thought about PE and she received a therapeutic dose of Lovenox empirically. No imaging study was performed.   However, patient came with the left-sided chest pain after a mechanical fall, not a syncopal event, with reproducible tenderness and CAT scan is showing rib fracture, besides patient is not tachycardic nor hypoxic, as such no clinical grounds to entertain PE.    DISCHARGE DIAGNOSES / PLAN:       fractures of the left lateral fifth through seventh ribs, nondisplaced.  Pain exacerbated by deep breathing with reproducible tenderness  -Pain control: Scheduled Tylenol, oxycodone and IV morphine for severe pain  -Encourage patient to use incentive spirometer and increase activity  -Repeat stress x-ray as she is complaining of right-sided rib cage pain     #Mild dehydration: Resolved with IV fluids resume losartan and diuretics. #Diabetes type II with hyperglycemia- resume oral hypoglycemics upon discharge     # RCC status post recent partial resection of the right kidney     #CAD status post stent in 1995 and 2014: Continue antiplatelets     #DJD with bilateral hip replacement, the left hip is bothering her.  Pelvic x-rays negative     #COPD without bronchospasm: Continue bronchodilators     #Hypertension: stable on current med regimen    Left axillary lymph node enlarged and lung nodules seen on chest CT- repeat Chest CT in 6 months-      ADDITIONAL CARE RECOMMENDATIONS:   Cardiac diet,   Activity as tolerated with OT/PT  Monitor oxygen levels and use oxygen as needed for SO2 levels < 90%  Continue using the incentive spirometer as instructed  Your platelet levels are slightly lower than normal- they were 129 on discharge  Consider having your platelet levels rechecked by your primary care or see hematologist as an outpatient  For lung nodules and enlarged lymph node in left armpit- monitor clinically and have your primary care MD repeat Chest CT in 6 months    PENDING TEST RESULTS:   At the time of discharge the following test results are still pending: none    FOLLOW UP APPOINTMENTS:    Follow-up Information     Follow up With Specialties Details Why Cedric Hennessy Credit [id number] [phone]    Brookie Aase., MD [address]  [address]  [facility] 7                DIET: cardiac    ACTIVITY: Activity as tolerated    WOUND CARE: NA    EQUIPMENT needed: none      DISCHARGE MEDICATIONS:  Current Discharge Medication List      START taking these medications    Details   albuterol-ipratropium (DUO-NEB) 2.5 mg-0.5 mg/3 ml nebu 3 mL by Nebulization route every six (6) hours as needed for Wheezing.   Qty: 30 Nebule, Refills: 0      !! acetaminophen (TYLENOL) 325 mg tablet Take 2 Tabs by mouth every four (4) hours as needed for Pain. Qty: 30 Tab, Refills: 0      !! acetaminophen (TYLENOL) 500 mg tablet Take 2 Tabs by mouth two (2) times a day for 29 days. Qty: 116 Tab, Refills: 0      hydroCHLOROthiazide (HYDRODIURIL) 25 mg tablet Take 1 Tab by mouth daily. Qty: 30 Tab, Refills: 2      lidocaine 4 % patch 1 Patch by TransDERmal route every twenty-four (24) hours. Qty: 30 Patch, Refills: 1      lactulose (CHRONULAC) 10 gram/15 mL solution Take 45 mL by mouth daily as needed for Other (constipation) for up to 30 days. Qty: 1 Bottle, Refills: 0      senna-docusate (PERICOLACE) 8.6-50 mg per tablet Take 2 Tabs by mouth two (2) times a day. Qty: 60 Tab, Refills: 0      oxyCODONE IR (ROXICODONE) 5 mg immediate release tablet Take 1 Tab by mouth every six (6) hours as needed for Pain for up to 3 days. Max Daily Amount: 20 mg.  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 - Potential duplicate medications found. Please discuss with provider. CONTINUE these medications which have CHANGED    Details   atorvastatin (LIPITOR) 20 mg tablet Take 1 Tab by mouth nightly. Qty: 30 Tab, Refills: 4         CONTINUE these medications which have NOT CHANGED    Details   dilTIAZem CD (CARDIZEM CD) 180 mg ER capsule Take 180 mg by mouth daily. glipiZIDE (GLUCOTROL) 5 mg tablet Take 5 mg by mouth daily. glucosamine-chondroitin (ARTHX) 500-400 mg cap Take 1-2 Caps by mouth daily. losartan (COZAAR) 100 mg tablet Take 100 mg by mouth daily. SITagliptin (JANUVIA) 50 mg tablet Take 50 mg by mouth daily. cholecalciferol (VITAMIN D3) (1000 Units /25 mcg) tablet Take 1,000 Units by mouth daily. vit C,L-Kh-wbiyl-lutein-zeaxan (PRESERVISION AREDS-2) 802-056-69-1 mg-unit-mg-mg cap capsule Take 1 Cap by mouth two (2) times daily (with meals).       aspirin delayed-release 81 mg tablet Take 1 Tab by mouth daily. Qty: 30 Tab, Refills: 11      tiotropium (SPIRIVA WITH HANDIHALER) 18 mcg inhalation capsule Take 1 Cap by inhalation daily. therapeutic multivitamin (THERAGRAN) tablet Take 1 Tab by mouth daily. latanoprost (XALATAN) 0.005 % ophthalmic solution Administer 1 Drop to both eyes nightly. nitroglycerin (NITROSTAT) 0.4 mg SL tablet 1 Tab by SubLINGual route every five (5) minutes as needed for Chest Pain. Qty: 1 Bottle, Refills: 2               NOTIFY YOUR PHYSICIAN FOR ANY OF THE FOLLOWING:   Fever over 101 degrees for 24 hours. Chest pain, shortness of breath, fever, chills, nausea, vomiting, diarrhea, change in mentation, falling, weakness, bleeding. Severe pain or pain not relieved by medications. Or, any other signs or symptoms that you may have questions about. DISPOSITION:    Home With:   OT  PT  HH  RN      X short term SNF/    Independent/assisted living    Hospice    Other:       PATIENT CONDITION AT DISCHARGE:     Functional status    Poor    X Deconditioned     Independent      Cognition   X  Lucid     Forgetful     Dementia      Catheters/lines (plus indication)    Mason     PICC     PEG    X None      Code status   X  Full code     DNR      PHYSICAL EXAMINATION AT DISCHARGE:  General:          Alert, cooperative, no distress, appears stated age. HEENT:           Atraumatic, anicteric sclerae, pink conjunctivae                          No oral ulcers, mucosa moist, throat clear, dentition fair  Neck:               Supple, symmetrical  Lungs:             Clear to auscultation bilaterally. No Wheezing or Rhonchi. No rales. Chest wall:       tenderness along left lateral ribcage  No Accessory muscle use. Heart:              Regular  rhythm,  No  murmur   No edema  Abdomen:        Soft, non-tender. Not distended. Bowel sounds normal  Extremities:     No cyanosis.   No clubbing,                            Skin turgor normal, Capillary refill normal  Skin:                Not pale. Not Jaundiced  No rashes   Psych:             Not anxious or agitated.   Neurologic:      Alert, moves all extremities, answers questions appropriately and responds to commands       CHRONIC MEDICAL DIAGNOSES:  Problem List as of 2/27/2020 Never Reviewed          Codes Class Noted - Resolved    Chest pain ICD-10-CM: R07.9  ICD-9-CM: 786.50  2/21/2020 - Present        Rib fracture ICD-10-CM: S22.39XA  ICD-9-CM: 807.00  2/21/2020 - Present              Greater than 30 minutes were spent with the patient on counseling and coordination of care    Signed:   Lakhwinder Zepeda MD  2/27/2020  1:11 PM

## 2020-02-27 NOTE — PROGRESS NOTES
Dr. Tenisha Miller notified that patient states they have not had a bowel movement in a week since 2/20/20. MD will place orders as needed.

## 2020-03-02 NOTE — CDMP QUERY
Coding Clarification / Recommended Query 1 of 1 Patient admitted with left sided chest pain s/p GLF. XR Ribs show Left fifth through seventh rib fractures. However, DCS only indicates 'Left anterior sixth rib fracture, nondisplaced'. If possible, please document in progress notes and/or discharge summary if you were treating any of the following: 
 
=> Three rib fractures on side 
=> Number Rib fractures, unspecified 
=> Other, please specify 
=> Clinically unable to determine The medical record reflects the following: 
 
Risk Factors: GLF Clinical Indicators: XR Ribs- Left fifth through seventh rib fractures;  
 
Treatment: XR; Tylenol; Oxycodone; IV Morphine Thank you, Shad Elias, RN, BSN, CCM Clinical  
[facility] 
[phone]

## 2020-05-13 ENCOUNTER — HOSPITAL ENCOUNTER (EMERGENCY)
Age: 80
Discharge: HOME | End: 2020-05-13
Attending: EMERGENCY MEDICINE | Admitting: STUDENT IN AN ORGANIZED HEALTH CARE EDUCATION/TRAINING PROGRAM
Payer: MEDICARE

## 2020-05-13 ENCOUNTER — APPOINTMENT (OUTPATIENT)
Dept: ULTRASOUND IMAGING | Age: 80
End: 2020-05-13
Attending: PHYSICIAN ASSISTANT
Payer: MEDICARE

## 2020-05-13 VITALS
DIASTOLIC BLOOD PRESSURE: 85 MMHG | WEIGHT: 184 LBS | HEART RATE: 78 BPM | RESPIRATION RATE: 18 BRPM | TEMPERATURE: 97.9 F | SYSTOLIC BLOOD PRESSURE: 161 MMHG | BODY MASS INDEX: 30.62 KG/M2 | OXYGEN SATURATION: 96 %

## 2020-05-13 DIAGNOSIS — N30.00 ACUTE CYSTITIS WITHOUT HEMATURIA: ICD-10-CM

## 2020-05-13 DIAGNOSIS — M54.6 ACUTE RIGHT-SIDED THORACIC BACK PAIN: Primary | ICD-10-CM

## 2020-05-13 LAB
ALBUMIN SERPL-MCNC: 4.2 G/DL (ref 3.5–5)
ALBUMIN/GLOB SERPL: 1 {RATIO} (ref 1.1–2.2)
ALP SERPL-CCNC: 156 U/L (ref 45–117)
ALT SERPL-CCNC: 21 U/L (ref 12–78)
ANION GAP SERPL CALC-SCNC: 5 MMOL/L (ref 5–15)
APPEARANCE UR: ABNORMAL
AST SERPL-CCNC: 11 U/L (ref 15–37)
BACTERIA URNS QL MICRO: ABNORMAL /HPF
BASOPHILS # BLD: 0 K/UL (ref 0–0.1)
BASOPHILS NFR BLD: 0 % (ref 0–1)
BILIRUB SERPL-MCNC: 0.6 MG/DL (ref 0.2–1)
BILIRUB UR QL: NEGATIVE
BUN SERPL-MCNC: 28 MG/DL (ref 6–20)
BUN/CREAT SERPL: 29 (ref 12–20)
CALCIUM SERPL-MCNC: 10.7 MG/DL (ref 8.5–10.1)
CHLORIDE SERPL-SCNC: 99 MMOL/L (ref 97–108)
CO2 SERPL-SCNC: 31 MMOL/L (ref 21–32)
COLOR UR: ABNORMAL
COMMENT, HOLDF: NORMAL
CREAT SERPL-MCNC: 0.95 MG/DL (ref 0.55–1.02)
DIFFERENTIAL METHOD BLD: NORMAL
EOSINOPHIL # BLD: 0.2 K/UL (ref 0–0.4)
EOSINOPHIL NFR BLD: 2 % (ref 0–7)
EPITH CASTS URNS QL MICRO: ABNORMAL /LPF
ERYTHROCYTE [DISTWIDTH] IN BLOOD BY AUTOMATED COUNT: 13.2 % (ref 11.5–14.5)
GLOBULIN SER CALC-MCNC: 4.1 G/DL (ref 2–4)
GLUCOSE SERPL-MCNC: 88 MG/DL (ref 65–100)
GLUCOSE UR STRIP.AUTO-MCNC: NEGATIVE MG/DL
HCT VFR BLD AUTO: 45.5 % (ref 35–47)
HGB BLD-MCNC: 14.4 G/DL (ref 11.5–16)
HGB UR QL STRIP: NEGATIVE
IMM GRANULOCYTES # BLD AUTO: 0 K/UL (ref 0–0.04)
IMM GRANULOCYTES NFR BLD AUTO: 0 % (ref 0–0.5)
KETONES UR QL STRIP.AUTO: NEGATIVE MG/DL
LEUKOCYTE ESTERASE UR QL STRIP.AUTO: ABNORMAL
LIPASE SERPL-CCNC: 122 U/L (ref 73–393)
LYMPHOCYTES # BLD: 1.7 K/UL (ref 0.8–3.5)
LYMPHOCYTES NFR BLD: 19 % (ref 12–49)
MCH RBC QN AUTO: 28.5 PG (ref 26–34)
MCHC RBC AUTO-ENTMCNC: 31.6 G/DL (ref 30–36.5)
MCV RBC AUTO: 90.1 FL (ref 80–99)
MONOCYTES # BLD: 0.6 K/UL (ref 0–1)
MONOCYTES NFR BLD: 7 % (ref 5–13)
NEUTS SEG # BLD: 6.4 K/UL (ref 1.8–8)
NEUTS SEG NFR BLD: 72 % (ref 32–75)
NITRITE UR QL STRIP.AUTO: POSITIVE
NRBC # BLD: 0 K/UL (ref 0–0.01)
NRBC BLD-RTO: 0 PER 100 WBC
PH UR STRIP: 5 [PH] (ref 5–8)
PLATELET # BLD AUTO: 176 K/UL (ref 150–400)
PMV BLD AUTO: 10.6 FL (ref 8.9–12.9)
POTASSIUM SERPL-SCNC: 3.4 MMOL/L (ref 3.5–5.1)
PROT SERPL-MCNC: 8.3 G/DL (ref 6.4–8.2)
PROT UR STRIP-MCNC: NEGATIVE MG/DL
RBC # BLD AUTO: 5.05 M/UL (ref 3.8–5.2)
RBC #/AREA URNS HPF: ABNORMAL /HPF (ref 0–5)
SAMPLES BEING HELD,HOLD: NORMAL
SODIUM SERPL-SCNC: 135 MMOL/L (ref 136–145)
SP GR UR REFRACTOMETRY: 1.02 (ref 1–1.03)
TROPONIN I SERPL-MCNC: <0.05 NG/ML
UA: UC IF INDICATED,UAUC: ABNORMAL
UROBILINOGEN UR QL STRIP.AUTO: 0.2 EU/DL (ref 0.2–1)
WBC # BLD AUTO: 8.9 K/UL (ref 3.6–11)
WBC URNS QL MICRO: ABNORMAL /HPF (ref 0–4)

## 2020-05-13 PROCEDURE — 76705 ECHO EXAM OF ABDOMEN: CPT

## 2020-05-13 PROCEDURE — 87086 URINE CULTURE/COLONY COUNT: CPT

## 2020-05-13 PROCEDURE — 36415 COLL VENOUS BLD VENIPUNCTURE: CPT

## 2020-05-13 PROCEDURE — 74011250637 HC RX REV CODE- 250/637: Performed by: PHYSICIAN ASSISTANT

## 2020-05-13 PROCEDURE — 80053 COMPREHEN METABOLIC PANEL: CPT

## 2020-05-13 PROCEDURE — 96365 THER/PROPH/DIAG IV INF INIT: CPT

## 2020-05-13 PROCEDURE — 87186 SC STD MICRODIL/AGAR DIL: CPT

## 2020-05-13 PROCEDURE — 74011000250 HC RX REV CODE- 250: Performed by: PHYSICIAN ASSISTANT

## 2020-05-13 PROCEDURE — 93005 ELECTROCARDIOGRAM TRACING: CPT

## 2020-05-13 PROCEDURE — 87077 CULTURE AEROBIC IDENTIFY: CPT

## 2020-05-13 PROCEDURE — 81001 URINALYSIS AUTO W/SCOPE: CPT

## 2020-05-13 PROCEDURE — 85025 COMPLETE CBC W/AUTO DIFF WBC: CPT

## 2020-05-13 PROCEDURE — 84484 ASSAY OF TROPONIN QUANT: CPT

## 2020-05-13 PROCEDURE — 96375 TX/PRO/DX INJ NEW DRUG ADDON: CPT

## 2020-05-13 PROCEDURE — 99284 EMERGENCY DEPT VISIT MOD MDM: CPT

## 2020-05-13 PROCEDURE — 83690 ASSAY OF LIPASE: CPT

## 2020-05-13 PROCEDURE — 74011000258 HC RX REV CODE- 258: Performed by: PHYSICIAN ASSISTANT

## 2020-05-13 PROCEDURE — 74011250636 HC RX REV CODE- 250/636: Performed by: PHYSICIAN ASSISTANT

## 2020-05-13 RX ORDER — KETOROLAC TROMETHAMINE 30 MG/ML
15 INJECTION, SOLUTION INTRAMUSCULAR; INTRAVENOUS
Status: COMPLETED | OUTPATIENT
Start: 2020-05-13 | End: 2020-05-13

## 2020-05-13 RX ORDER — HYDROCODONE BITARTRATE AND ACETAMINOPHEN 5; 325 MG/1; MG/1
1 TABLET ORAL
Qty: 12 TAB | Refills: 0 | Status: SHIPPED | OUTPATIENT
Start: 2020-05-13 | End: 2020-05-16

## 2020-05-13 RX ORDER — OXYCODONE HYDROCHLORIDE 5 MG/1
5 TABLET ORAL
Status: COMPLETED | OUTPATIENT
Start: 2020-05-13 | End: 2020-05-13

## 2020-05-13 RX ORDER — LIDOCAINE 4 G/100G
1 PATCH TOPICAL EVERY 24 HOURS
Status: DISCONTINUED | OUTPATIENT
Start: 2020-05-13 | End: 2020-05-14 | Stop reason: HOSPADM

## 2020-05-13 RX ORDER — CEPHALEXIN 500 MG/1
500 CAPSULE ORAL 2 TIMES DAILY
Qty: 14 CAP | Refills: 0 | Status: SHIPPED | OUTPATIENT
Start: 2020-05-13 | End: 2020-05-20

## 2020-05-13 RX ORDER — HYDROCODONE BITARTRATE AND ACETAMINOPHEN 5; 325 MG/1; MG/1
1 TABLET ORAL
Status: COMPLETED | OUTPATIENT
Start: 2020-05-13 | End: 2020-05-13

## 2020-05-13 RX ADMIN — CEFTRIAXONE 1 G: 1 INJECTION, POWDER, FOR SOLUTION INTRAMUSCULAR; INTRAVENOUS at 21:09

## 2020-05-13 RX ADMIN — HYDROCODONE BITARTRATE AND ACETAMINOPHEN 1 TABLET: 5; 325 TABLET ORAL at 19:39

## 2020-05-13 RX ADMIN — OXYCODONE 5 MG: 5 TABLET ORAL at 21:08

## 2020-05-13 RX ADMIN — KETOROLAC TROMETHAMINE 15 MG: 30 INJECTION, SOLUTION INTRAMUSCULAR at 19:39

## 2020-05-13 NOTE — ED TRIAGE NOTES
TRIAGE: Pt arrives from home with c/o back pain, under shoulder blade on right side that started Thursday. Denies fall or injury. Pt reports increased difficulty getting around since pain began. Pt was seen Saturday at PCP and given methocarbamol and naproxen with no relief. Pt sent here for labs and ultrasound for suspected gallbladder or pancreas issue. Denies urinary symptoms, N/V, fever.

## 2020-05-13 NOTE — ED PROVIDER NOTES
75-year-old female history of heart disease, right renal cancer status post partial nephrectomy, COPD, diabetes, reflux, hypertension, MI presenting to the ED for right back pain. Patient reports that she started Thursday, 6 days ago, with right-sided thoracic back pain that has been relatively constant since onset. Patient notes the pain is explicitly worsened with any movement, reports difficulty getting out of the chair or moving her right arm due to pain. Patient denies any radiation to the abdomen. Denies chest pain, shortness of breath, fever, hematuria, dysuria, frequency, numbness, weakness. Patient has been taking naproxen and methocarbamol prescribed at [facility] with no relief of pain. No fall or trauma. No other concerns. Past medical history: As above  Social history: Former smoker           Past Medical History:   Diagnosis Date    CAD (coronary artery disease)     Cancer of kidney ([facility]     right    COPD     Diabetes ([facility]     Gastrointestinal disorder     GERD (gastroesophageal reflux disease)     Hypertension     MI (myocardial infarction) ([facility]     Dr. Samir Brown Osteoporosis     Rib fracture left       Past Surgical History:   Procedure Laterality Date    CARDIAC SURG PROCEDURE UNLIST      stents x 3    HX HEENT      tonsillectomy    HX NEPHRECTOMY Right     partial [id number] for ca    HX ORTHOPAEDIC      bilateral hips replaced    HX ORTHOPAEDIC      L wrist titanium gentry placed    HX ORTHOPAEDIC      carpal tunnel bilaterally    HX ORTHOPAEDIC      trigger finger release on bilateral thumbs and ring and middle finger on L hand         History reviewed. No pertinent family history.     Social History     Socioeconomic History    Marital status:      Spouse name: Not on file    Number of children: Not on file    Years of education: Not on file    Highest education level: Not on file   Occupational History    Not on file   Social Needs    Financial resource strain: Not on file    Food insecurity     Worry: Not on file     Inability: Not on file    Transportation needs     Medical: Not on file     Non-medical: Not on file   Tobacco Use    Smoking status: Former Smoker     Last attempt to quit: 1994     Years since quittin.0    Smokeless tobacco: Never Used   Substance and Sexual Activity    Alcohol use: Yes     Comment: rarely    Drug use: No    Sexual activity: Not on file   Lifestyle    Physical activity     Days per week: Not on file     Minutes per session: Not on file    Stress: Not on file   Relationships    Social connections     Talks on phone: Not on file     Gets together: Not on file     Attends Congregation service: Not on file     Active member of club or organization: Not on file     Attends meetings of clubs or organizations: Not on file     Relationship status: Not on file    Intimate partner violence     Fear of current or ex partner: Not on file     Emotionally abused: Not on file     Physically abused: Not on file     Forced sexual activity: Not on file   Other Topics Concern    Not on file   Social History Narrative    Not on file         ALLERGIES: Atenolol; Codeine; and Macrobid [nitrofurantoin monohyd/m-cryst]    Review of Systems   Constitutional: Negative for fever. HENT: Negative for facial swelling. Respiratory: Negative for shortness of breath. Cardiovascular: Negative for chest pain. Gastrointestinal: Negative for vomiting. Musculoskeletal: Positive for back pain. Skin: Negative for wound. Neurological: Negative for syncope. All other systems reviewed and are negative. Vitals:    20 1754 20 2200   BP: 174/76 161/85   Pulse: 88 78   Resp: 18 18   Temp: 97.3 °F (36.3 °C) 97.9 °F (36.6 °C)   SpO2: 95% 96%   Weight: 83.5 kg (184 lb)             Physical Exam  Vitals signs and nursing note reviewed. Constitutional:       General: She is not in acute distress.      Appearance: She is well-developed. Comments: Elderly white female, appears uncomfortable with movement   HENT:      Head: Normocephalic and atraumatic. Right Ear: External ear normal.      Left Ear: External ear normal.   Eyes:      General: No scleral icterus. Conjunctiva/sclera: Conjunctivae normal.   Neck:      Musculoskeletal: Neck supple. Trachea: No tracheal deviation. Cardiovascular:      Rate and Rhythm: Normal rate and regular rhythm. Heart sounds: Normal heart sounds. No murmur. No friction rub. No gallop. Pulmonary:      Effort: Pulmonary effort is normal. No respiratory distress. Breath sounds: Normal breath sounds. No stridor. No wheezing. Abdominal:      General: There is no distension. Palpations: Abdomen is soft. Musculoskeletal: Normal range of motion. Back:    Skin:     General: Skin is warm and dry. Neurological:      Mental Status: She is alert and oriented to person, place, and time. Psychiatric:         Behavior: Behavior normal.          MDM  Number of Diagnoses or Management Options  Acute cystitis without hematuria:   Acute right-sided thoracic back pain:   Diagnosis management comments: 28-year-old female presenting to the ER for 6 days of right-sided thoracic back pain that is explicitly worsened with movement or palpation, not pleuritic. No chest pain, abdominal pain, shortness of breath. Characterization of pain and exam are largely consistent with musculoskeletal pain. Given patient's age, comorbidities, will check basic labs, EKG, ultrasound of the right upper quadrant/right flank. Overall reassuring labs and ultrasound, sonographer noted some tenderness in the right upper quadrant, however patient has no abdominal tenderness on exam, normal LFTs, no abnormalities of the gallbladder noted on imaging.   Urinalysis somewhat consistent with UTI, given patient's age will treat, pain is somewhat in the flank region, however is specifically reproducible with movement of the arm, no fever, leukocytosis, vomiting concerning for true pyelonephritis. Will send culture and start empirically on Keflex. Discussed with patient and family outside the symptoms are most consistent with musculoskeletal pain, will treat as such. Return precautions given. Amount and/or Complexity of Data Reviewed  Clinical lab tests: ordered and reviewed  Tests in the radiology section of CPT®: ordered and reviewed  Discuss the patient with other providers: yes (Dr. She Oliver ED attending)           Procedures          Discussed all results and symptoms with son outside of the ER. Discussed that no indications at this time of significant intra-abdominal pathology, cardiac source of pain, explained that given the pain is worse with palpation and movement musculoskeletal pain is most likely. Son admits that she does not have a particularly high pain tolerance. Discussed that we can try an additional medication for home, need for close primary care follow-up. VEL Rodriguez     I was personally available for consultation in the emergency department. I have reviewed the chart and agree with the documentation recorded by the [facility], including the assessment, treatment plan, and disposition.   Carrol Pollack MD

## 2020-05-14 LAB
ATRIAL RATE: 86 BPM
CALCULATED P AXIS, ECG09: 65 DEGREES
CALCULATED R AXIS, ECG10: 23 DEGREES
CALCULATED T AXIS, ECG11: 33 DEGREES
DIAGNOSIS, 93000: NORMAL
P-R INTERVAL, ECG05: 146 MS
Q-T INTERVAL, ECG07: 362 MS
QRS DURATION, ECG06: 70 MS
QTC CALCULATION (BEZET), ECG08: 433 MS
VENTRICULAR RATE, ECG03: 86 BPM

## 2020-05-14 NOTE — ED NOTES
Patient family member called to transport patient home at discharge. Patient given discharge paperwork and instructions. No questions or concerns at this time. Patient wheeled out of unit in wheelchair. VSS.

## 2020-05-14 NOTE — DISCHARGE INSTRUCTIONS
Patient Education     Return for new or worsening symptoms. Follow-up with your primary care. You may continue to use the medications prescribed at [facility] for mild to moderate pain, or new medicine from today for severe pain. Be aware that medication may cause drowsiness, do not combine with alcohol or take if you are going to be driving. Patient Education        Urinary Tract Infection in Women: Care Instructions  Your Care Instructions    A urinary tract infection, or UTI, is a general term for an infection anywhere between the kidneys and the urethra (where urine comes out). Most UTIs are bladder infections. They often cause pain or burning when you urinate. UTIs are caused by bacteria and can be cured with antibiotics. Be sure to complete your treatment so that the infection goes away. Follow-up care is a key part of your treatment and safety. Be sure to make and go to all appointments, and call your doctor if you are having problems. It's also a good idea to know your test results and keep a list of the medicines you take. How can you care for yourself at home? · Take your antibiotics as directed. Do not stop taking them just because you feel better. You need to take the full course of antibiotics. · Drink extra water and other fluids for the next day or two. This may help wash out the bacteria that are causing the infection. (If you have kidney, heart, or liver disease and have to limit fluids, talk with your doctor before you increase your fluid intake.)  · Avoid drinks that are carbonated or have caffeine. They can irritate the bladder. · Urinate often. Try to empty your bladder each time. · To relieve pain, take a hot bath or lay a heating pad set on low over your lower belly or genital area. Never go to sleep with a heating pad in place. To prevent UTIs  · Drink plenty of water each day. This helps you urinate often, which clears bacteria from your system.  (If you have kidney, heart, or liver disease and have to limit fluids, talk with your doctor before you increase your fluid intake.)  · Urinate when you need to. · Urinate right after you have sex. · Change sanitary pads often. · Avoid douches, bubble baths, feminine hygiene sprays, and other feminine hygiene products that have deodorants. · After going to the bathroom, wipe from front to back. When should you call for help? Call your doctor now or seek immediate medical care if:    · Symptoms such as fever, chills, nausea, or vomiting get worse or appear for the first time.     · You have new pain in your back just below your rib cage. This is called flank pain.     · There is new blood or pus in your urine.     · You have any problems with your antibiotic medicine.    Watch closely for changes in your health, and be sure to contact your doctor if:    · You are not getting better after taking an antibiotic for 2 days.     · Your symptoms go away but then come back. Where can you learn more? Go to http://shawna-stephanie.info/  Enter T462 in the search box to learn more about \"Urinary Tract Infection in Women: Care Instructions. \"  Current as of: August 21, 2019Content Version: 12.4  © 9385-5794 Healthwise, Incorporated. Care instructions adapted under license by [employer] (which disclaims liability or warranty for this information). If you have questions about a medical condition or this instruction, always ask your healthcare professional. [place] [address] any warranty or liability for your use of this information. Back Pain: Care Instructions  Your Care Instructions    Back pain has many possible causes. It is often related to problems with muscles and ligaments of the back. It may also be related to problems with the nerves, discs, or bones of the back. Moving, lifting, standing, sitting, or sleeping in an awkward way can strain the back.  Sometimes you don't notice the injury until later. Arthritis is another common cause of back pain. Although it may hurt a lot, back pain usually improves on its own within several weeks. Most people recover in 12 weeks or less. Using good home treatment and being careful not to stress your back can help you feel better sooner. Follow-up care is a key part of your treatment and safety. Be sure to make and go to all appointments, and call your doctor if you are having problems. It's also a good idea to know your test results and keep a list of the medicines you take. How can you care for yourself at home? · Sit or lie in positions that are most comfortable and reduce your pain. Try one of these positions when you lie down:  ? Lie on your back with your knees bent and supported by large pillows. ? Lie on the floor with your legs on the seat of a sofa or chair. ? Lie on your side with your knees and hips bent and a pillow between your legs. ? Lie on your stomach if it does not make pain worse. · Do not sit up in bed, and avoid soft couches and twisted positions. Bed rest can help relieve pain at first, but it delays healing. Avoid bed rest after the first day of back pain. · Change positions every 30 minutes. If you must sit for long periods of time, take breaks from sitting. Get up and walk around, or lie in a comfortable position. · Try using a heating pad on a low or medium setting for 15 to 20 minutes every 2 or 3 hours. Try a warm shower in place of one session with the heating pad. · You can also try an ice pack for 10 to 15 minutes every 2 to 3 hours. Put a thin cloth between the ice pack and your skin. · Take pain medicines exactly as directed. ? If the doctor gave you a prescription medicine for pain, take it as prescribed. ? If you are not taking a prescription pain medicine, ask your doctor if you can take an over-the-counter medicine. · Take short walks several times a day.  You can start with 5 to 10 minutes, 3 or 4 times a day, and work up to longer walks. Walk on level surfaces and avoid hills and stairs until your back is better. · Return to work and other activities as soon as you can. Continued rest without activity is usually not good for your back. · To prevent future back pain, do exercises to stretch and strengthen your back and stomach. Learn how to use good posture, safe lifting techniques, and proper body mechanics. When should you call for help? Call your doctor now or seek immediate medical care if:    · You have new or worsening numbness in your legs.     · You have new or worsening weakness in your legs. (This could make it hard to stand up.)     · You lose control of your bladder or bowels.    Watch closely for changes in your health, and be sure to contact your doctor if:    · You have a fever, lose weight, or don't feel well.     · You do not get better as expected. Where can you learn more? Go to http://shawna-stephanie.info/  Enter I594 in the search box to learn more about \"Back Pain: Care Instructions. \"  Current as of: June 26, 2019Content Version: 12.4  © 0132-4903 Healthwise, Incorporated. Care instructions adapted under license by [employer] (which disclaims liability or warranty for this information). If you have questions about a medical condition or this instruction, always ask your healthcare professional. [address] any warranty or liability for your use of this information.

## 2020-05-15 LAB
BACTERIA SPEC CULT: ABNORMAL
CC UR VC: ABNORMAL
SERVICE CMNT-IMP: ABNORMAL

## 2020-06-18 ENCOUNTER — HOSPITAL ENCOUNTER (OUTPATIENT)
Dept: MRI IMAGING | Age: 80
Discharge: HOME | End: 2020-06-18
Attending: PHYSICIAN ASSISTANT

## 2020-06-18 DIAGNOSIS — S22.000A COMPRESSION FRACTURE OF THORACIC VERTEBRA (HCC): ICD-10-CM

## 2020-06-19 ENCOUNTER — HOSPITAL ENCOUNTER (OUTPATIENT)
Dept: MRI IMAGING | Age: 80
Discharge: HOME | End: 2020-06-19
Attending: PHYSICIAN ASSISTANT
Payer: MEDICARE

## 2020-06-19 PROCEDURE — 72146 MRI CHEST SPINE W/O DYE: CPT

## 2020-06-26 ENCOUNTER — OFFICE VISIT (OUTPATIENT)
Dept: PRIMARY CARE CLINIC | Age: 80
End: 2020-06-26

## 2020-06-26 VITALS — OXYGEN SATURATION: 92 % | HEART RATE: 85 BPM | TEMPERATURE: 97.5 F

## 2020-06-26 DIAGNOSIS — Z11.59 SPECIAL SCREENING EXAMINATION FOR UNSPECIFIED VIRAL DISEASE: ICD-10-CM

## 2020-06-26 DIAGNOSIS — Z01.818 PRE-OP EXAM: Primary | ICD-10-CM

## 2020-06-26 NOTE — PROGRESS NOTES
Patient is being seen at the [facility]. [facility]. Please see scanned documentation as well for further information. S:  Ms. Samreen Ortiz presents for pre-op or pre-procedure testing for Covid 19. Patient has procedure or surgery by Radiologist scheduled for 7/1/20. Patient denies current Covid type symptoms. O:    Visit Vitals  Pulse 85   Temp 97.5 °F (36.4 °C) (Oral)   SpO2 92%     Alert and oriented  No acute distress, no increased work of breathing  Normocephalic, atraumatic  Skin color normal  Calm and cooperative  Voice clear, conversant without shortness of breath    A/P:  Pre-op, Pre-procedure exam    Covid 19 testing performed  Patient understands she will be contacted if the results are positive. Follow up prn.

## 2020-06-29 LAB — SARS-COV-2, NAA: NOT DETECTED

## 2020-07-01 ENCOUNTER — HOSPITAL ENCOUNTER (OUTPATIENT)
Dept: INTERVENTIONAL RADIOLOGY/VASCULAR | Age: 80
Discharge: HOME | End: 2020-07-01
Attending: PHYSICIAN ASSISTANT | Admitting: RADIOLOGY
Payer: MEDICARE

## 2020-07-01 VITALS
SYSTOLIC BLOOD PRESSURE: 154 MMHG | DIASTOLIC BLOOD PRESSURE: 63 MMHG | OXYGEN SATURATION: 92 % | RESPIRATION RATE: 20 BRPM | TEMPERATURE: 98.6 F | WEIGHT: 182 LBS | HEIGHT: 62 IN | BODY MASS INDEX: 33.49 KG/M2 | HEART RATE: 79 BPM

## 2020-07-01 DIAGNOSIS — S22.050A WEDGE COMPRESSION FRACTURE OF T6 VERTEBRA (HCC): ICD-10-CM

## 2020-07-01 DIAGNOSIS — S22.000A COMPRESSION FRACTURE OF THORACIC VERTEBRA (HCC): ICD-10-CM

## 2020-07-01 DIAGNOSIS — M54.6 PAIN IN THORACIC SPINE: ICD-10-CM

## 2020-07-01 DIAGNOSIS — S22.060A CLOSED WEDGE COMPRESSION FRACTURE OF T8 VERTEBRA (HCC): ICD-10-CM

## 2020-07-01 PROCEDURE — 77030003666 HC NDL SPINAL BD -A

## 2020-07-01 PROCEDURE — 22513 PERQ VERTEBRAL AUGMENTATION: CPT

## 2020-07-01 PROCEDURE — 74011250636 HC RX REV CODE- 250/636

## 2020-07-01 PROCEDURE — 77030034843 HC TAMP SPN BN INFL EXP II KYPH -H

## 2020-07-01 PROCEDURE — 74011250636 HC RX REV CODE- 250/636: Performed by: RADIOLOGY

## 2020-07-01 PROCEDURE — 77030034842 HC TAMP SPN BN INFL EXP II KYPH -I

## 2020-07-01 PROCEDURE — 77030030399

## 2020-07-01 PROCEDURE — 77030021783 HC SYS CEM DEL MEDT -D

## 2020-07-01 PROCEDURE — C1713 ANCHOR/SCREW BN/BN,TIS/BN: HCPCS

## 2020-07-01 PROCEDURE — 77030021782 HC SYS CEM CART DEL KYPH -C

## 2020-07-01 PROCEDURE — 77030037492 HC KT BN ACCS OSTEOCOOL MEDT -E

## 2020-07-01 PROCEDURE — 74011000250 HC RX REV CODE- 250: Performed by: RADIOLOGY

## 2020-07-01 RX ORDER — KETOROLAC TROMETHAMINE 30 MG/ML
15 INJECTION, SOLUTION INTRAMUSCULAR; INTRAVENOUS
Status: COMPLETED | OUTPATIENT
Start: 2020-07-01 | End: 2020-07-01

## 2020-07-01 RX ORDER — CEFAZOLIN SODIUM/WATER 2 G/20 ML
2 SYRINGE (ML) INTRAVENOUS
Status: COMPLETED | OUTPATIENT
Start: 2020-07-01 | End: 2020-07-01

## 2020-07-01 RX ORDER — FENTANYL CITRATE 50 UG/ML
100 INJECTION, SOLUTION INTRAMUSCULAR; INTRAVENOUS
Status: DISCONTINUED | OUTPATIENT
Start: 2020-07-01 | End: 2020-07-01

## 2020-07-01 RX ORDER — SODIUM CHLORIDE 9 MG/ML
25 INJECTION, SOLUTION INTRAVENOUS
Status: COMPLETED | OUTPATIENT
Start: 2020-07-01 | End: 2020-07-01

## 2020-07-01 RX ORDER — BUPIVACAINE HYDROCHLORIDE 5 MG/ML
20 INJECTION, SOLUTION EPIDURAL; INTRACAUDAL
Status: COMPLETED | OUTPATIENT
Start: 2020-07-01 | End: 2020-07-01

## 2020-07-01 RX ORDER — LIDOCAINE HYDROCHLORIDE 20 MG/ML
20 INJECTION, SOLUTION INFILTRATION; PERINEURAL ONCE
Status: COMPLETED | OUTPATIENT
Start: 2020-07-01 | End: 2020-07-01

## 2020-07-01 RX ORDER — TRAMADOL HYDROCHLORIDE 50 MG/1
50 TABLET ORAL
COMMUNITY

## 2020-07-01 RX ORDER — MIDAZOLAM HYDROCHLORIDE 1 MG/ML
5 INJECTION, SOLUTION INTRAMUSCULAR; INTRAVENOUS
Status: DISCONTINUED | OUTPATIENT
Start: 2020-07-01 | End: 2020-07-01

## 2020-07-01 RX ORDER — MIDAZOLAM HYDROCHLORIDE 1 MG/ML
INJECTION, SOLUTION INTRAMUSCULAR; INTRAVENOUS
Status: COMPLETED
Start: 2020-07-01 | End: 2020-07-01

## 2020-07-01 RX ORDER — DIPHENHYDRAMINE HYDROCHLORIDE 50 MG/ML
25 INJECTION, SOLUTION INTRAMUSCULAR; INTRAVENOUS
Status: COMPLETED | OUTPATIENT
Start: 2020-07-01 | End: 2020-07-01

## 2020-07-01 RX ADMIN — BUPIVACAINE HYDROCHLORIDE 20 ML: 5 INJECTION, SOLUTION EPIDURAL; INTRACAUDAL; PERINEURAL at 11:32

## 2020-07-01 RX ADMIN — FENTANYL CITRATE 50 MCG: 50 INJECTION INTRAMUSCULAR; INTRAVENOUS at 11:09

## 2020-07-01 RX ADMIN — FENTANYL CITRATE 25 MCG: 50 INJECTION INTRAMUSCULAR; INTRAVENOUS at 11:26

## 2020-07-01 RX ADMIN — KETOROLAC TROMETHAMINE 15 MG: 30 INJECTION, SOLUTION INTRAMUSCULAR at 10:05

## 2020-07-01 RX ADMIN — SODIUM CHLORIDE 25 ML/HR: 900 INJECTION, SOLUTION INTRAVENOUS at 09:50

## 2020-07-01 RX ADMIN — CEFAZOLIN SODIUM 2 G: 100 INJECTION, POWDER, LYOPHILIZED, FOR SOLUTION INTRAVENOUS at 10:50

## 2020-07-01 RX ADMIN — BUPIVACAINE HYDROCHLORIDE 20 ML: 5 INJECTION, SOLUTION EPIDURAL; INTRACAUDAL; PERINEURAL at 11:28

## 2020-07-01 RX ADMIN — LIDOCAINE HYDROCHLORIDE 20 ML: 20 INJECTION, SOLUTION INFILTRATION; PERINEURAL at 11:28

## 2020-07-01 RX ADMIN — MIDAZOLAM HYDROCHLORIDE 0.5 MG: 1 INJECTION, SOLUTION INTRAMUSCULAR; INTRAVENOUS at 11:12

## 2020-07-01 RX ADMIN — DIPHENHYDRAMINE HYDROCHLORIDE 25 MG: 50 INJECTION, SOLUTION INTRAMUSCULAR; INTRAVENOUS at 11:09

## 2020-07-01 RX ADMIN — MIDAZOLAM HYDROCHLORIDE 0.5 MG: 1 INJECTION, SOLUTION INTRAMUSCULAR; INTRAVENOUS at 11:27

## 2020-07-01 NOTE — PROGRESS NOTES
02 has been applied @ 2l/NC for C/O dyspnea at rest. 02 saturation improved from 90 to 98%. Patient has intermittent wheezing. Dr. Miko Rios at bedside to assess and review consent.

## 2020-07-01 NOTE — H&P
Interventional and Vascular Radiology History and Physical    Patient: Maria Dolores Horse 78 y.o. female       Chief Complaint: No chief complaint on file. History of Present Illness: T6 and T7 fractures     History:    Past Medical History:   Diagnosis Date    CAD (coronary artery disease)     [facility]     right    COPD     [facility]     Gastrointestinal disorder     GERD (gastroesophageal reflux disease)     Hypertension     MI [facility] infarction) ([facility]     Dr. Mario Bernardo Osteoporosis     Rib fracture left     No family history on file.   Social History     Socioeconomic History    Marital status:      Spouse name: Not on file    Number of children: Not on file    Years of education: Not on file    Highest education level: Not on file   Occupational History    Not on file   Social Needs    Financial resource strain: Not on file    Food insecurity     Worry: Not on file     Inability: Not on file    Transportation needs     Medical: Not on file     Non-medical: Not on file   Tobacco Use    Smoking status: Former Smoker     Last attempt to quit: 1994     Years since quittin.2    Smokeless tobacco: Never Used   Substance and Sexual Activity    Alcohol use: Yes     Comment: rarely    Drug use: No    Sexual activity: Not on file   Lifestyle    Physical activity     Days per week: Not on file     Minutes per session: Not on file    Stress: Not on file   Relationships    Social connections     Talks on phone: Not on file     Gets together: Not on file     Attends Ten[employer] service: Not on file     Active member of club or organization: Not on file     Attends meetings of clubs or organizations: Not on file     Relationship status: Not on file    Intimate partner violence     Fear of current or ex partner: Not on file     Emotionally abused: Not on file     Physically abused: Not on file     Forced sexual activity: Not on file   Other Topics Concern    Not on file   Social History Narrative    Not on file       Allergies: Allergies   Allergen Reactions    Atenolol Hives    Codeine Nausea and Vomiting    Macrobid [Nitrofurantoin Monohyd/M-Cryst] Rash       Current Medications:  No current facility-administered medications for this encounter. Physical Exam:  Blood pressure 154/63, pulse 79, temperature 98.6 °F (37 °C), resp. rate 20, height 5' 2\" (1.575 m), weight 82.6 kg (182 lb), SpO2 92 %. LUNG: clear to auscultation bilaterally, HEART: regular rate and rhythm, S1, S2 normal, no murmur, click, rub or gallop      Alerts:      Laboratory:    No results for input(s): HGB, HCT, WBC, PLT, INR, BUN, CREA, K, CRCLT, HGBEXT, HCTEXT, PLTEXT, INREXT in the last 72 hours. No lab exists for component: PTT, PT      Plan of Care/Planned Procedure:  Risks, benefits, and alternatives reviewed with patient and she agrees to proceed with the procedure. Conscious sedation will be performed with IV fentanyl and versed.  Plan is for T6 and T7 kyphoplasty       Balbina Mayes MD

## 2020-07-01 NOTE — PROGRESS NOTES
Ambulatory to bathroom from Angio 1 using cane and 1 person assist. Patient reports no pain in back after return to angio 1 sitting in chair awaiting discharge. No dyspnea. VS are stable and return to baseline. Has had water to drink and santino crackers to eat. I have phoned son Anatoliy Sanchez and have reviewed discharge instructions with him stressing post sedation discharge and kyphoplasty teaching for any unusual symptoms, phone numbers to contact us and the importance of fall prevention at home. Son and patient voice good understanding of this.

## 2020-07-01 NOTE — ROUTINE PROCESS
Pt arrives via W/C to angio department accompanied by  Jessica Bennett [phone] and son Torres Navarro [phone] who will be picking patient up after kyphoplasty thoracic level 6 & 7 procedure by Dr. Alex Ellis. All assessments completed and consent was reviewed. Education given regarding procedure, conscious sedation, post-procedure care and  management/follow-up. Opportunity for questions was provided and all questions and concerns were addressed.

## 2020-07-17 ENCOUNTER — HOSPITAL ENCOUNTER (OUTPATIENT)
Dept: GENERAL RADIOLOGY | Age: 80
Discharge: HOME | End: 2020-07-17
Attending: FAMILY MEDICINE
Payer: MEDICARE

## 2020-07-17 DIAGNOSIS — M54.6 THORACIC BACK PAIN: ICD-10-CM

## 2020-07-17 PROCEDURE — 72070 X-RAY EXAM THORAC SPINE 2VWS: CPT

## 2020-07-17 PROCEDURE — 72072 X-RAY EXAM THORAC SPINE 3VWS: CPT

## 2020-07-24 ENCOUNTER — HOSPITAL ENCOUNTER (OUTPATIENT)
Dept: MRI IMAGING | Age: 80
Discharge: HOME | End: 2020-07-24
Attending: FAMILY MEDICINE
Payer: MEDICARE

## 2020-07-24 DIAGNOSIS — M54.6 PAIN IN THORACIC SPINE: ICD-10-CM

## 2020-07-24 PROCEDURE — 72146 MRI CHEST SPINE W/O DYE: CPT

## 2020-07-27 ENCOUNTER — ANESTHESIA EVENT (OUTPATIENT)
Dept: INTERVENTIONAL RADIOLOGY/VASCULAR | Age: 80
End: 2020-07-27
Payer: MEDICARE

## 2020-07-27 ENCOUNTER — HOSPITAL ENCOUNTER (OUTPATIENT)
Dept: INTERVENTIONAL RADIOLOGY/VASCULAR | Age: 80
Discharge: HOME | End: 2020-07-27
Attending: FAMILY MEDICINE | Admitting: RADIOLOGY
Payer: MEDICARE

## 2020-07-27 ENCOUNTER — ANESTHESIA (OUTPATIENT)
Dept: INTERVENTIONAL RADIOLOGY/VASCULAR | Age: 80
End: 2020-07-27
Payer: MEDICARE

## 2020-07-27 VITALS
SYSTOLIC BLOOD PRESSURE: 118 MMHG | TEMPERATURE: 97.5 F | OXYGEN SATURATION: 94 % | RESPIRATION RATE: 22 BRPM | DIASTOLIC BLOOD PRESSURE: 41 MMHG | HEART RATE: 89 BPM

## 2020-07-27 DIAGNOSIS — T14.8XXA FRACTURE: ICD-10-CM

## 2020-07-27 LAB
GLUCOSE BLD STRIP.AUTO-MCNC: 179 MG/DL (ref 65–100)
SERVICE CMNT-IMP: ABNORMAL

## 2020-07-27 PROCEDURE — 77030003666 HC NDL SPINAL BD -A

## 2020-07-27 PROCEDURE — 76060000033 HC ANESTHESIA 1 TO 1.5 HR

## 2020-07-27 PROCEDURE — 94640 AIRWAY INHALATION TREATMENT: CPT

## 2020-07-27 PROCEDURE — 82962 GLUCOSE BLOOD TEST: CPT

## 2020-07-27 PROCEDURE — 77030021783 HC SYS CEM DEL MEDT -D

## 2020-07-27 PROCEDURE — 74011250636 HC RX REV CODE- 250/636: Performed by: RADIOLOGY

## 2020-07-27 PROCEDURE — 74011000250 HC RX REV CODE- 250: Performed by: RADIOLOGY

## 2020-07-27 PROCEDURE — 77030021782 HC SYS CEM CART DEL KYPH -C

## 2020-07-27 PROCEDURE — C1713 ANCHOR/SCREW BN/BN,TIS/BN: HCPCS

## 2020-07-27 PROCEDURE — 22513 PERQ VERTEBRAL AUGMENTATION: CPT

## 2020-07-27 PROCEDURE — 94664 DEMO&/EVAL PT USE INHALER: CPT

## 2020-07-27 RX ORDER — LIDOCAINE HYDROCHLORIDE 20 MG/ML
20 INJECTION, SOLUTION INFILTRATION; PERINEURAL ONCE
Status: COMPLETED | OUTPATIENT
Start: 2020-07-27 | End: 2020-07-27

## 2020-07-27 RX ORDER — ONDANSETRON 2 MG/ML
INJECTION INTRAMUSCULAR; INTRAVENOUS AS NEEDED
Status: DISCONTINUED | OUTPATIENT
Start: 2020-07-27 | End: 2020-07-27 | Stop reason: HOSPADM

## 2020-07-27 RX ORDER — CEFAZOLIN SODIUM 1 G/3ML
2 INJECTION, POWDER, FOR SOLUTION INTRAMUSCULAR; INTRAVENOUS ONCE
Status: DISCONTINUED | OUTPATIENT
Start: 2020-07-27 | End: 2020-07-27 | Stop reason: CLARIF

## 2020-07-27 RX ORDER — ALBUTEROL SULFATE 0.83 MG/ML
2.5 SOLUTION RESPIRATORY (INHALATION)
Status: COMPLETED | OUTPATIENT
Start: 2020-07-27 | End: 2020-07-27

## 2020-07-27 RX ORDER — FENTANYL CITRATE 50 UG/ML
INJECTION, SOLUTION INTRAMUSCULAR; INTRAVENOUS
Status: COMPLETED
Start: 2020-07-27 | End: 2020-07-27

## 2020-07-27 RX ORDER — DEXMEDETOMIDINE HYDROCHLORIDE 100 UG/ML
INJECTION, SOLUTION INTRAVENOUS AS NEEDED
Status: DISCONTINUED | OUTPATIENT
Start: 2020-07-27 | End: 2020-07-27 | Stop reason: HOSPADM

## 2020-07-27 RX ORDER — CEFAZOLIN SODIUM 1 G/3ML
INJECTION, POWDER, FOR SOLUTION INTRAMUSCULAR; INTRAVENOUS
Status: DISCONTINUED
Start: 2020-07-27 | End: 2020-07-27 | Stop reason: ALTCHOICE

## 2020-07-27 RX ORDER — SODIUM CHLORIDE 9 MG/ML
INJECTION, SOLUTION INTRAVENOUS
Status: DISCONTINUED | OUTPATIENT
Start: 2020-07-27 | End: 2020-07-27 | Stop reason: HOSPADM

## 2020-07-27 RX ORDER — SODIUM CHLORIDE 9 MG/ML
50 INJECTION, SOLUTION INTRAVENOUS CONTINUOUS
Status: DISCONTINUED | OUTPATIENT
Start: 2020-07-27 | End: 2020-07-27 | Stop reason: ALTCHOICE

## 2020-07-27 RX ORDER — PROPOFOL 10 MG/ML
INJECTION, EMULSION INTRAVENOUS AS NEEDED
Status: DISCONTINUED | OUTPATIENT
Start: 2020-07-27 | End: 2020-07-27 | Stop reason: HOSPADM

## 2020-07-27 RX ORDER — BUPIVACAINE HYDROCHLORIDE 5 MG/ML
5 INJECTION, SOLUTION EPIDURAL; INTRACAUDAL
Status: COMPLETED | OUTPATIENT
Start: 2020-07-27 | End: 2020-07-27

## 2020-07-27 RX ORDER — FENTANYL CITRATE 50 UG/ML
INJECTION, SOLUTION INTRAMUSCULAR; INTRAVENOUS AS NEEDED
Status: DISCONTINUED | OUTPATIENT
Start: 2020-07-27 | End: 2020-07-27 | Stop reason: HOSPADM

## 2020-07-27 RX ADMIN — FENTANYL CITRATE 25 MCG: 50 INJECTION, SOLUTION INTRAMUSCULAR; INTRAVENOUS at 14:43

## 2020-07-27 RX ADMIN — BUPIVACAINE HYDROCHLORIDE 20 MG: 5 INJECTION, SOLUTION EPIDURAL; INTRACAUDAL at 14:28

## 2020-07-27 RX ADMIN — SODIUM CHLORIDE: 9 INJECTION, SOLUTION INTRAVENOUS at 14:14

## 2020-07-27 RX ADMIN — PROPOFOL 30 MG: 10 INJECTION, EMULSION INTRAVENOUS at 14:59

## 2020-07-27 RX ADMIN — DEXMEDETOMIDINE HYDROCHLORIDE 8 MCG: 100 INJECTION, SOLUTION INTRAVENOUS at 14:14

## 2020-07-27 RX ADMIN — ALBUTEROL SULFATE 2.5 MG: 2.5 SOLUTION RESPIRATORY (INHALATION) at 10:48

## 2020-07-27 RX ADMIN — PROPOFOL 30 MG: 10 INJECTION, EMULSION INTRAVENOUS at 14:39

## 2020-07-27 RX ADMIN — ONDANSETRON 4 MG: 2 INJECTION INTRAMUSCULAR; INTRAVENOUS at 14:19

## 2020-07-27 RX ADMIN — PROPOFOL 30 MG: 10 INJECTION, EMULSION INTRAVENOUS at 14:33

## 2020-07-27 RX ADMIN — LIDOCAINE HYDROCHLORIDE 20 MG: 20 INJECTION, SOLUTION INFILTRATION; PERINEURAL at 14:28

## 2020-07-27 RX ADMIN — WATER 2 G: 1 INJECTION INTRAMUSCULAR; INTRAVENOUS; SUBCUTANEOUS at 14:00

## 2020-07-27 RX ADMIN — DEXMEDETOMIDINE HYDROCHLORIDE 8 MCG: 100 INJECTION, SOLUTION INTRAVENOUS at 14:25

## 2020-07-27 RX ADMIN — FENTANYL CITRATE 25 MCG: 50 INJECTION, SOLUTION INTRAMUSCULAR; INTRAVENOUS at 15:03

## 2020-07-27 RX ADMIN — DEXMEDETOMIDINE HYDROCHLORIDE 8 MCG: 100 INJECTION, SOLUTION INTRAVENOUS at 14:36

## 2020-07-27 RX ADMIN — FENTANYL CITRATE 25 MCG: 50 INJECTION, SOLUTION INTRAMUSCULAR; INTRAVENOUS at 14:56

## 2020-07-27 RX ADMIN — PROPOFOL 30 MG: 10 INJECTION, EMULSION INTRAVENOUS at 14:51

## 2020-07-27 RX ADMIN — FENTANYL CITRATE 25 MCG: 50 INJECTION, SOLUTION INTRAMUSCULAR; INTRAVENOUS at 14:41

## 2020-07-27 RX ADMIN — PROPOFOL 40 MG: 10 INJECTION, EMULSION INTRAVENOUS at 14:24

## 2020-07-27 NOTE — H&P
Interventional and Vascular Radiology History and Physical    Patient: Denece Jessica 78 y.o. female       Chief Complaint: No chief complaint on file. History of Present Illness: T5 vertebral fracture     History:    Past Medical History:   Diagnosis Date    CAD (coronary artery disease)     [facility]     right    COPD     [facility]     Gastrointestinal disorder     GERD (gastroesophageal reflux disease)     Hypertension     MI (myocardial infarction) ([facility]     Dr. Renee Beckford Osteoporosis     Rib fracture left     No family history on file.   Social History     Socioeconomic History    Marital status:      Spouse name: Not on file    Number of children: Not on file    Years of education: Not on file    Highest education level: Not on file   Occupational History    Not on file   Social Needs    Financial resource strain: Not on file    Food insecurity     Worry: Not on file     Inability: Not on file    Transportation needs     Medical: Not on file     Non-medical: Not on file   Tobacco Use    Smoking status: Former Smoker     Last attempt to quit: 1994     Years since quittin.2    Smokeless tobacco: Never Used   Substance and Sexual Activity    Alcohol use: Yes     Comment: rarely    Drug use: No    Sexual activity: Not on file   Lifestyle    Physical activity     Days per week: Not on file     Minutes per session: Not on file    Stress: Not on file   Relationships    Social connections     Talks on phone: Not on file     Gets together: Not on file     Attends Congregation service: Not on file     Active member of club or organization: Not on file     Attends meetings of clubs or organizations: Not on file     Relationship status: Not on file    Intimate partner violence     Fear of current or ex partner: Not on file     Emotionally abused: Not on file     Physically abused: Not on file     Forced sexual activity: Not on file   Other Topics Concern    Not on file   Social History Narrative    Not on file       Allergies: Allergies   Allergen Reactions    Atenolol Hives    Codeine Nausea and Vomiting    Macrobid [Nitrofurantoin Monohyd/M-Cryst] Rash       Current Medications:  Current Facility-Administered Medications   Medication Dose Route Frequency    iohexoL (OMNIPAQUE) 240 mg iodine/mL solution 50 mL  50 mL Intrathecal RAD ONCE    0.9% sodium chloride infusion  50 mL/hr IntraVENous CONTINUOUS    ceFAZolin (ANCEF) 1 gram injection            Physical Exam:  Blood pressure 113/50, pulse 80, temperature 97.5 °F (36.4 °C), resp. rate 12, SpO2 98 %. LUNG: clear to auscultation bilaterally, HEART: regular rate and rhythm, S1, S2 normal, no murmur, click, rub or gallop      Alerts:      Laboratory:    No results for input(s): HGB, HCT, WBC, PLT, INR, BUN, CREA, K, CRCLT, HGBEXT, HCTEXT, PLTEXT, INREXT in the last 72 hours. No lab exists for component: PTT, PT      Plan of Care/Planned Procedure:  Risks, benefits, and alternatives reviewed with patient and she agrees to proceed with the procedure. Conscious sedation will be performed with IV fentanyl and versed.  Plan is for T5 kyphoplasty       Queen Fidencio MD

## 2020-07-27 NOTE — PROGRESS NOTES
Turned and repositioned onto right side with pillows behind back and between legs for comfort and warm blankets applied.

## 2020-07-27 NOTE — PROGRESS NOTES
Spoke with son, Zuleyma Tiwari ar . Let him knkow that start time will be approx 1pm due to anesthesia schedule.

## 2020-07-27 NOTE — PROGRESS NOTES
Pt to unit via W/C. To bathroom assisted, using cane, walking with nurse assist.  Pt wheezing b/l, placed 02 via n/c at 2L. Mason cath placed as ordered, iv placed. bs =179. Pt resting awaiting anesthesia for case. Pt for kyphoplasty.

## 2020-07-27 NOTE — ANESTHESIA PREPROCEDURE EVALUATION
Relevant Problems   No relevant active problems       Anesthetic History   No history of anesthetic complications            Review of Systems / Medical History  Patient summary reviewed, nursing notes reviewed and pertinent labs reviewed    Pulmonary  Within defined limits  COPD               Neuro/Psych   Within defined limits           Cardiovascular  Within defined limits  Hypertension          Past MI and CAD         GI/Hepatic/Renal  Within defined limits   GERD           Endo/Other  Within defined limits  Diabetes         Other Findings              Physical Exam    Airway  Mallampati: II  TM Distance: > 6 cm  Neck ROM: normal range of motion   Mouth opening: Normal     Cardiovascular  Regular rate and rhythm,  S1 and S2 normal,  no murmur, click, rub, or gallop             Dental    Dentition: Implants     Pulmonary  Breath sounds clear to auscultation               Abdominal  GI exam deferred       Other Findings            Anesthetic Plan    ASA: 3  Anesthesia type: MAC            Anesthetic plan and risks discussed with: Patient

## 2020-07-27 NOTE — ANESTHESIA POSTPROCEDURE EVALUATION
* No procedures listed *. MAC    Anesthesia Post Evaluation      Multimodal analgesia: multimodal analgesia used between 6 hours prior to anesthesia start to PACU discharge  Patient location during evaluation: PACU  Patient participation: complete - patient participated  Level of consciousness: awake  Pain score: 2  Pain management: adequate  Airway patency: patent  Anesthetic complications: no  Cardiovascular status: acceptable  Respiratory status: acceptable  Hydration status: acceptable  Comments: I have evaluated the patient and meets criteria for discharge from PACU.  Juan Mariee MD  Post anesthesia nausea and vomiting:  controlled      INITIAL Post-op Vital signs:   Vitals Value Taken Time   /50 7/27/2020  3:16 PM   Temp     Pulse 80 7/27/2020  3:16 PM   Resp 12 7/27/2020  3:16 PM   SpO2 98 % 7/27/2020  3:16 PM

## 2020-07-27 NOTE — PROGRESS NOTES
Reviewed discharge instructions with patient with good understanding. Assisted to vehicle via wheelchair transport. Dressings dry and intact.

## 2020-08-10 ENCOUNTER — HOSPITAL ENCOUNTER (OUTPATIENT)
Dept: GENERAL RADIOLOGY | Age: 80
Discharge: HOME | End: 2020-08-10
Payer: MEDICARE

## 2020-08-10 DIAGNOSIS — G89.29 CHRONIC PAIN: ICD-10-CM

## 2020-08-10 DIAGNOSIS — M54.9 DORSALGIA: ICD-10-CM

## 2020-08-10 PROCEDURE — 72100 X-RAY EXAM L-S SPINE 2/3 VWS: CPT

## 2021-09-24 ENCOUNTER — TRANSCRIBE ORDER (OUTPATIENT)
Dept: REGISTRATION | Age: 81
End: 2021-09-24

## 2021-09-24 ENCOUNTER — HOSPITAL ENCOUNTER (OUTPATIENT)
Dept: PREADMISSION TESTING | Age: 81
Discharge: HOME | End: 2021-09-24
Payer: MEDICARE

## 2021-09-24 DIAGNOSIS — Z01.812 PRE-PROCEDURE LAB EXAM: ICD-10-CM

## 2021-09-24 DIAGNOSIS — Z01.812 PRE-PROCEDURE LAB EXAM: Primary | ICD-10-CM

## 2021-09-24 PROCEDURE — U0005 INFEC AGEN DETEC AMPLI PROBE: HCPCS

## 2021-09-25 LAB
SARS-COV-2, XPLCVT: NOT DETECTED
SOURCE, COVRS: NORMAL

## 2021-09-28 ENCOUNTER — ANESTHESIA (OUTPATIENT)
Dept: ENDOSCOPY | Age: 81
End: 2021-09-28
Payer: MEDICARE

## 2021-09-28 ENCOUNTER — HOSPITAL ENCOUNTER (OUTPATIENT)
Age: 81
Setting detail: OUTPATIENT SURGERY
Discharge: HOME | End: 2021-09-28
Attending: INTERNAL MEDICINE | Admitting: INTERNAL MEDICINE
Payer: MEDICARE

## 2021-09-28 ENCOUNTER — ANESTHESIA EVENT (OUTPATIENT)
Dept: ENDOSCOPY | Age: 81
End: 2021-09-28
Payer: MEDICARE

## 2021-09-28 VITALS
BODY MASS INDEX: 26.34 KG/M2 | TEMPERATURE: 97.8 F | DIASTOLIC BLOOD PRESSURE: 70 MMHG | SYSTOLIC BLOOD PRESSURE: 147 MMHG | WEIGHT: 144 LBS | HEART RATE: 72 BPM | OXYGEN SATURATION: 94 % | RESPIRATION RATE: 27 BRPM

## 2021-09-28 PROCEDURE — 76040000019: Performed by: INTERNAL MEDICINE

## 2021-09-28 PROCEDURE — 77030020268 HC MISC GENERAL SUPPLY: Performed by: INTERNAL MEDICINE

## 2021-09-28 PROCEDURE — 77030021593 HC FCPS BIOP ENDOSC BSC -A: Performed by: INTERNAL MEDICINE

## 2021-09-28 PROCEDURE — 74011250636 HC RX REV CODE- 250/636: Performed by: INTERNAL MEDICINE

## 2021-09-28 PROCEDURE — 76060000031 HC ANESTHESIA FIRST 0.5 HR: Performed by: INTERNAL MEDICINE

## 2021-09-28 PROCEDURE — 74011250636 HC RX REV CODE- 250/636: Performed by: NURSE ANESTHETIST, CERTIFIED REGISTERED

## 2021-09-28 PROCEDURE — 2709999900 HC NON-CHARGEABLE SUPPLY: Performed by: INTERNAL MEDICINE

## 2021-09-28 PROCEDURE — 88305 TISSUE EXAM BY PATHOLOGIST: CPT

## 2021-09-28 RX ORDER — SODIUM CHLORIDE 0.9 % (FLUSH) 0.9 %
5-40 SYRINGE (ML) INJECTION EVERY 8 HOURS
Status: DISCONTINUED | OUTPATIENT
Start: 2021-09-28 | End: 2021-09-28 | Stop reason: HOSPADM

## 2021-09-28 RX ORDER — SODIUM CHLORIDE 9 MG/ML
50 INJECTION, SOLUTION INTRAVENOUS CONTINUOUS
Status: DISCONTINUED | OUTPATIENT
Start: 2021-09-28 | End: 2021-09-28 | Stop reason: HOSPADM

## 2021-09-28 RX ORDER — SODIUM CHLORIDE 0.9 % (FLUSH) 0.9 %
5-40 SYRINGE (ML) INJECTION AS NEEDED
Status: DISCONTINUED | OUTPATIENT
Start: 2021-09-28 | End: 2021-09-28 | Stop reason: HOSPADM

## 2021-09-28 RX ORDER — PROPOFOL 10 MG/ML
INJECTION, EMULSION INTRAVENOUS AS NEEDED
Status: DISCONTINUED | OUTPATIENT
Start: 2021-09-28 | End: 2021-09-28 | Stop reason: HOSPADM

## 2021-09-28 RX ORDER — DEXTROMETHORPHAN/PSEUDOEPHED 2.5-7.5/.8
1.2 DROPS ORAL
Status: DISCONTINUED | OUTPATIENT
Start: 2021-09-28 | End: 2021-09-28 | Stop reason: HOSPADM

## 2021-09-28 RX ORDER — EPINEPHRINE 0.1 MG/ML
1 INJECTION INTRACARDIAC; INTRAVENOUS
Status: DISCONTINUED | OUTPATIENT
Start: 2021-09-28 | End: 2021-09-28 | Stop reason: HOSPADM

## 2021-09-28 RX ORDER — ATROPINE SULFATE 0.1 MG/ML
0.5 INJECTION INTRAVENOUS
Status: DISCONTINUED | OUTPATIENT
Start: 2021-09-28 | End: 2021-09-28 | Stop reason: HOSPADM

## 2021-09-28 RX ORDER — NALOXONE HYDROCHLORIDE 0.4 MG/ML
0.4 INJECTION, SOLUTION INTRAMUSCULAR; INTRAVENOUS; SUBCUTANEOUS
Status: DISCONTINUED | OUTPATIENT
Start: 2021-09-28 | End: 2021-09-28 | Stop reason: HOSPADM

## 2021-09-28 RX ORDER — FLUMAZENIL 0.1 MG/ML
0.2 INJECTION INTRAVENOUS
Status: DISCONTINUED | OUTPATIENT
Start: 2021-09-28 | End: 2021-09-28 | Stop reason: HOSPADM

## 2021-09-28 RX ORDER — EPHEDRINE SULFATE/0.9% NACL/PF 50 MG/5 ML
SYRINGE (ML) INTRAVENOUS
Status: DISCONTINUED
Start: 2021-09-28 | End: 2021-09-28 | Stop reason: HOSPADM

## 2021-09-28 RX ADMIN — PROPOFOL 20 MG: 10 INJECTION, EMULSION INTRAVENOUS at 15:40

## 2021-09-28 RX ADMIN — PROPOFOL 30 MG: 10 INJECTION, EMULSION INTRAVENOUS at 15:36

## 2021-09-28 RX ADMIN — PROPOFOL 20 MG: 10 INJECTION, EMULSION INTRAVENOUS at 15:38

## 2021-09-28 RX ADMIN — SODIUM CHLORIDE: 900 INJECTION, SOLUTION INTRAVENOUS at 15:31

## 2021-09-28 NOTE — H&P
Pre-Endoscopy H&P Update  Chief complaint/HPI/ROS:  The indication for the procedure, the patient's history and the patient's current medications are reviewed prior to the procedure and that data is reported on the H&P to which this document is attached. Any significant complaints with regard to organ systems will be noted, and if not mentioned then a review of systems is not contributory. Past Medical History:   Diagnosis Date    CAD (coronary artery disease)     [facility] ([facility]     right    COPD     [facility]     Gastrointestinal disorder     GERD (gastroesophageal reflux disease)     Hypertension     MI (my[facility] infarction) ([facility]     Dr. Lonell Severs Osteoporosis     Rib fracture left      Past Surgical History:   Procedure Laterality Date    HX HEENT      tonsillectomy    HX NEPHRECTOMY Right     partial  for ca    HX ORTHOPAEDIC      bilateral hips replaced    HX ORTHOPAEDIC      L wrist titanium gentry placed    HX ORTHOPAEDIC      carpal tunnel bilaterally    HX ORTHOPAEDIC      trigger finger release on bilateral thumbs and ring and middle finger on L hand    IR KYPHOPLASTY THORACIC  2020    IR KYPHOPLASTY THORACIC  2020    RI CARDIAC SURG PROCEDURE UNLIST      stents x 3     Social   Social History     Tobacco Use    Smoking status: Former Smoker     Quit date: 1994     Years since quittin.4    Smokeless tobacco: Never Used   Substance Use Topics    Alcohol use: Yes     Comment: rarely      History reviewed. No pertinent family history. Allergies   Allergen Reactions    Atenolol Hives    Codeine Nausea and Vomiting    Macrobid [Nitrofurantoin Monohyd/M-Cryst] Rash      Prior to Admission Medications   Prescriptions Last Dose Informant Patient Reported? Taking? SITagliptin (JANUVIA) 50 mg tablet 2021 at Unknown time  Yes Yes   Sig: Take 50 mg by mouth daily.    acetaminophen (TYLENOL) 325 mg tablet 2021 at Unknown time  No Yes   Sig: Take 2 Tabs by mouth every four (4) hours as needed for Pain. albuterol-ipratropium (DUO-NEB) 2.5 mg-0.5 mg/3 ml nebu 2021 at Unknown time  No Yes   Sig: 3 mL by Nebulization route every six (6) hours as needed for Wheezing. aspirin delayed-release 81 mg tablet 2021 at Unknown time  No Yes   Sig: Take 1 Tab by mouth daily. atorvastatin (LIPITOR) 20 mg tablet 2021 at Unknown time  No Yes   Sig: Take 1 Tab by mouth nightly. cholecalciferol (VITAMIN D3) (1000 Units /25 mcg) tablet 2021 at Unknown time  Yes Yes   Sig: Take 1,000 Units by mouth daily. dilTIAZem CD (CARDIZEM CD) 180 mg ER capsule 2021 at Unknown time  Yes Yes   Sig: Take 180 mg by mouth daily. glipiZIDE (GLUCOTROL) 5 mg tablet 2021 at Unknown time  Yes Yes   Sig: Take 5 mg by mouth daily. glucosamine-chondroitin (ARTHX) 500-400 mg cap   Yes No   Sig: Take 1-2 Caps by mouth daily. hydroCHLOROthiazide (HYDRODIURIL) 25 mg tablet 2021 at Unknown time  No Yes   Sig: Take 1 Tab by mouth daily. latanoprost (XALATAN) 0.005 % ophthalmic solution Not Taking at Unknown time  Yes No   Sig: Administer 1 Drop to both eyes nightly. Patient not taking: Reported on 2021   lidocaine 4 % patch Not Taking at Unknown time  No No   Si Patch by TransDERmal route every twenty-four (24) hours. Patient not taking: Reported on 2021   losartan (COZAAR) 100 mg tablet 2021 at Unknown time  Yes Yes   Sig: Take 100 mg by mouth daily. nitroglycerin (NITROSTAT) 0.4 mg SL tablet Unknown at Unknown time  No No   Si Tab by SubLINGual route every five (5) minutes as needed for Chest Pain. senna-docusate (PERICOLACE) 8.6-50 mg per tablet Not Taking at Unknown time  No No   Sig: Take 2 Tabs by mouth two (2) times a day. Patient not taking: Reported on 2021   therapeutic multivitamin [facility]) tablet Not Taking at Unknown time  Yes No   Sig: Take 1 Tab by mouth daily.    Patient not taking: Reported on 9/28/2021   tiotropium (SPIRIVA WITH HANDIHALER) 18 mcg inhalation capsule 9/28/2021 at Unknown time  Yes Yes   Sig: Take 1 Cap by inhalation daily. traMADoL (ULTRAM) 50 mg tablet Not Taking at Unknown time  Yes No   Sig: Take 50 mg by mouth every six (6) hours as needed for Pain. Indications: pain   Patient not taking: Reported on 9/28/2021   vit C,Y-Wy-uueko-lutein-zeaxan (PRESERVISION AREDS-2) 832-615-08-1 mg-unit-mg-mg cap capsule 9/28/2021 at Unknown time  Yes Yes   Sig: Take 1 Cap by mouth two (2) times daily (with meals). Facility-Administered Medications: None       PHYSICAL EXAM:  The patient is examined immediately prior to the procedure. Visit Vitals  BP (!) 155/83   Pulse 74   Resp 25   Wt 65.3 kg (144 lb)   SpO2 96%   Breastfeeding No   BMI 26.34 kg/m²     Gen: Appears comfortable, no distress. Pulm: comfortable respirations with no abnormal audible breath sounds  CV: heart regular, well perfused  GI: abdomen flat. PLAN:  Informed consent discussion held, patient afforded an opportunity to ask questions and all questions answered. After being advised of the risks, benefits, and alternatives, the patient requested that we proceed and indicated so on a written consent form. Will proceed with procedure as planned.   Domenic Hernandez MD

## 2021-09-28 NOTE — DISCHARGE INSTRUCTIONS
[address] [facility] [address] [place]. Nhi Rankin MD  [phone]      September 28, 2021     Sky Chavira  YOB: 1940    ENDOSCOPY DISCHARGE INSTRUCTIONS    If there is redness at IV site you should apply warm compress to area. If redness or soreness persist contact Dr. Nhi Rankin or your primary care doctor. Gaseous discomfort may develop, but walking, belching will help relieve this. You may not operate a vehicle for 12 hours  You may not operate machinery or dangerous appliances for rest of today  You may not drink alcoholic beverages for 12 hours  Avoid making any critical decisions for 24 hours    DIET:  You may resume your normal diet, but some patients find that heavy or large meals may lead to indigestion or vomiting. I suggest a light meal as first food intake. MEDICATIONS:  The use of some over-the-counter pain medication may lead to bleeding after biopsies or other procedures you may have had done. Tylenol (also called acetaminophen) is safe to take and will not lead to bleeding. Based on the procedure you had today you may safely take aspirin or aspirin-like products for the next ten (10) days. ACTIVITY:  You may resume your normal household activities, but it is recommended that you spend the remainder of the day resting -  avoid any strenuous activity. CALL DR. MITCHELL'S OFFICE IF:  Increasing pain, nausea, vomiting  Abdominal distension (swelling)  Significant new or increased bleeding (oral or rectal)  Fever/Chills  Chest pain/shortness of breath                   Additional instructions:   No obstruction in the esophagus. Dilation of the esophagus performed. Monitor for symptom improvement over the next week. If no improvement, call [facility] office to arrange for follow up and/or further testing. It was an honor to be your doctor today.   Please let me or my office staff know if you have any feedback about today's procedure.     Jackie Morris MD, September 28, 2021

## 2021-09-28 NOTE — ANESTHESIA POSTPROCEDURE EVALUATION
Post-Anesthesia Evaluation and Assessment    Patient: Pj Wells MRN: [id number]  SSN: [id number]    YOB: 1940  Age: [de-identified] y.o. Sex: female      I have evaluated the patient and they are stable and ready for discharge from the PACU. Cardiovascular Function/Vital Signs  Visit Vitals  /62   Pulse 73   Temp 36.6 °C (97.8 °F)   Resp 27   Wt 65.3 kg (144 lb)   SpO2 94%   Breastfeeding No   BMI 26.34 kg/m²       Patient is status post MAC anesthesia for Procedure(s):  ESOPHAGOGASTRODUODENOSCOPY (EGD)    :-  ESOPHAGOGASTRODUODENAL (EGD) BIOPSY  ESOPHAGEAL DILATION. Nausea/Vomiting: None    Postoperative hydration reviewed and adequate. Pain:  Pain Scale 1: Numeric (0 - 10) (09/28/21 1555)  Pain Intensity 1: 0 (09/28/21 1555)   Managed    Neurological Status: At baseline    Mental Status, Level of Consciousness: Alert and  oriented to person, place, and time    Pulmonary Status:   O2 Device: None (Room air) (09/28/21 1555)   Adequate oxygenation and airway patent    Complications related to anesthesia: None    Post-anesthesia assessment completed. No concerns    Signed By: Eitan Lazaro MD     September 28, 2021              Procedure(s):  ESOPHAGOGASTRODUODENOSCOPY (EGD)    :-  ESOPHAGOGASTRODUODENAL (EGD) BIOPSY  ESOPHAGEAL DILATION. MAC    <BSHSIANPOST>    INITIAL Post-op Vital signs:   Vitals Value Taken Time   /62 09/28/21 1555   Temp 36.6 °C (97.8 °F) 09/28/21 1549   Pulse 74 09/28/21 1600   Resp 26 09/28/21 1600   SpO2 94 % 09/28/21 1600   Vitals shown include unvalidated device data.

## 2021-09-28 NOTE — PROGRESS NOTES
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 Valerio Jenkins  Assistant(s): Endoscopy RN-1: Veronica Acosta RN  Endoscopy RN-2: Scottie Hardy    Specimens:   ID Type Source Tests Collected by Time Destination   1 : random gastric biopsy Preservative Gastric  Elisha Albarado MD 9/28/2021 1537 Pathology     H. Pylori  no    Assessment:      Anesthesia gave intra-procedure sedation and medications, see anesthesia flow sheet yes    Intravenous fluids: NS@ KVO     Vital signs stable     Abdominal assessment: round and soft     Recommendation:  Discharge patient per MD order.     Family   Permission to share finding with family or friend yes

## 2021-09-28 NOTE — PERIOP NOTES

## 2021-09-28 NOTE — PROCEDURES
[address] [facility].  [address] [facility] [address]  [place], [address]   [phone]                            NAME:  Linda Lundy   :   1940   MRN:   [id number]     Date/Time:  2021 3:46 PM    Esophagogastroduodenoscopy (EGD) Procedure Note    :  Milton Hess MD    Staff: Endoscopy RN-1: Hershel Collet, RN  Endoscopy RN-2: Robert Helm    Referring Provider:  Gini Bustillos MD    Anethesia/Sedation:  MAC    Procedure Details   After infomed consent was obtained for the procedure, with all risks and benefits of procedure explained the patient was taken to the endoscopy suite and placed in the left lateral decubitus position. Following sequential administration of sedation as per above, the gastroscope was inserted into the mouth and advanced under direct vision to second portion of the duodenum. A careful inspection was made as the gastroscope was withdrawn, including a retroflexed view of the proximal stomach; findings and interventions are described below. Findings:  Esophagus: mild tortuosity and dilation of distal esophagus, but no resistance to scope passage through normal z-line at 37cm from incisors; no stricture or stenosis or mass  Stomach: a few erosions in the body and antrum with diffuse mild atrophy in the antrum; biopsied mucosa randomly to evaluate for H pylori  Duodenum:normal  After examination, a guidewire was passed to the antrum and a 54 Fr Savary dilator was passed for esophageal empiric dilation; repeat examination of the esophagus revealed no superficial defect or tear. Interventions:  biopsy of stomach body and antrum           Specimens Removed:    ID Type Source Tests Collected by Time Destination   1 : random gastric biopsy Preservative Gastric  Jack Saucedo MD 2021 1537 Pathology       Complications: None.      EBL:  none    Impression:    See Postoperative diagnosis above    Recommendations:   - Await pathology evaluation of biopsy / resected tissue   - Monitor response to dilation. If no improvement, discuss obtaining manometry versus lifestyle changes. - Resume normal medications. - Resume previous diet.     Discharge disposition:  Home in the company of  when able to ambulate    Dandre Reis MD

## 2021-09-28 NOTE — ANESTHESIA PREPROCEDURE EVALUATION
Relevant Problems   No relevant active problems       Anesthetic History   No history of anesthetic complications            Review of Systems / Medical History  Patient summary reviewed, nursing notes reviewed and pertinent labs reviewed    Pulmonary  Within defined limits  COPD: moderate               Neuro/Psych   Within defined limits           Cardiovascular  Within defined limits  Hypertension          Past MI, CAD, cardiac stents and hyperlipidemia    Exercise tolerance: <4 METS     GI/Hepatic/Renal  Within defined limits   GERD           Endo/Other  Within defined limits  Diabetes: type 2         Other Findings   Comments: Part neph for RCCa           Physical Exam    Airway  Mallampati: II  TM Distance: > 6 cm  Neck ROM: normal range of motion   Mouth opening: Normal     Cardiovascular  Regular rate and rhythm,  S1 and S2 normal,  no murmur, click, rub, or gallop             Dental  No notable dental hx       Pulmonary  Breath sounds clear to auscultation               Abdominal  GI exam deferred       Other Findings            Anesthetic Plan    ASA: 3  Anesthesia type: MAC          Induction: Intravenous  Anesthetic plan and risks discussed with: Patient

## 2021-11-16 ENCOUNTER — TRANSCRIBE ORDER (OUTPATIENT)
Dept: SCHEDULING | Age: 81
End: 2021-11-16

## 2021-11-16 DIAGNOSIS — M54.59 ACUTE MECHANICAL LOW BACK PAIN WITH DURATION OF LESS THAN SIX WEEKS: Primary | ICD-10-CM

## 2022-03-19 PROBLEM — S22.39XA RIB FRACTURE: Status: ACTIVE | Noted: 2020-02-21

## 2022-03-19 PROBLEM — R07.9 CHEST PAIN: Status: ACTIVE | Noted: 2020-02-21

## 2023-05-15 RX ORDER — LATANOPROST 50 UG/ML
1 SOLUTION/ DROPS OPHTHALMIC
COMMUNITY

## 2023-05-15 RX ORDER — ASPIRIN 81 MG/1
TABLET ORAL DAILY
COMMUNITY
Start: 2014-04-18

## 2023-05-15 RX ORDER — DILTIAZEM HYDROCHLORIDE 180 MG/1
180 CAPSULE, EXTENDED RELEASE ORAL DAILY
COMMUNITY

## 2023-05-15 RX ORDER — GLIPIZIDE 5 MG/1
5 TABLET ORAL DAILY
COMMUNITY

## 2023-05-15 RX ORDER — NITROGLYCERIN 0.4 MG/1
TABLET SUBLINGUAL
COMMUNITY
Start: 2014-04-18

## 2023-05-15 RX ORDER — IPRATROPIUM BROMIDE AND ALBUTEROL SULFATE 2.5; .5 MG/3ML; MG/3ML
SOLUTION RESPIRATORY (INHALATION) EVERY 6 HOURS PRN
COMMUNITY
Start: 2020-02-27

## 2023-05-15 RX ORDER — SODIUM PHOSPHATE,MONO-DIBASIC 19G-7G/118
1-2 ENEMA (ML) RECTAL DAILY
COMMUNITY

## 2023-05-15 RX ORDER — ACETAMINOPHEN 325 MG/1
TABLET ORAL EVERY 4 HOURS PRN
COMMUNITY
Start: 2020-02-27

## 2023-05-15 RX ORDER — HYDROCHLOROTHIAZIDE 25 MG/1
TABLET ORAL DAILY
COMMUNITY
Start: 2020-02-28

## 2023-05-15 RX ORDER — TRAMADOL HYDROCHLORIDE 50 MG/1
TABLET ORAL EVERY 6 HOURS PRN
COMMUNITY

## 2023-05-15 RX ORDER — AMOXICILLIN 250 MG
2 CAPSULE ORAL 2 TIMES DAILY
COMMUNITY
Start: 2020-02-27

## 2023-05-15 RX ORDER — LIDOCAINE 4 G/G
1 PATCH TOPICAL EVERY 24 HOURS
COMMUNITY
Start: 2020-02-27

## 2023-05-15 RX ORDER — LOSARTAN POTASSIUM 100 MG/1
100 TABLET ORAL DAILY
COMMUNITY

## 2023-05-15 RX ORDER — ATORVASTATIN CALCIUM 20 MG/1
TABLET, FILM COATED ORAL
COMMUNITY
Start: 2020-02-27

## (undated) DEVICE — TUBING HYDR IRR --

## (undated) DEVICE — DILATOR ES 57FR 19MM OVR THE GWIRE FLX SAFEGUIDE

## (undated) DEVICE — FORCEPS BX L240CM JAW DIA2.8MM L CAP W/ NDL MIC MESH TOOTH

## (undated) DEVICE — SAVORY DILATOR